# Patient Record
Sex: FEMALE | Race: AMERICAN INDIAN OR ALASKA NATIVE | NOT HISPANIC OR LATINO | Employment: FULL TIME | URBAN - METROPOLITAN AREA
[De-identification: names, ages, dates, MRNs, and addresses within clinical notes are randomized per-mention and may not be internally consistent; named-entity substitution may affect disease eponyms.]

---
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
## 2019-01-16 ENCOUNTER — ALLIED HEALTH/NURSE VISIT (OUTPATIENT)
Dept: FAMILY MEDICINE | Facility: CLINIC | Age: 19
End: 2019-01-16
Payer: COMMERCIAL

## 2019-01-16 DIAGNOSIS — Z30.42 ENCOUNTER FOR SURVEILLANCE OF INJECTABLE CONTRACEPTIVE: Primary | ICD-10-CM

## 2019-01-16 RX ORDER — MEDROXYPROGESTERONE ACETATE 150 MG/ML
150 INJECTION, SUSPENSION INTRAMUSCULAR
Status: DISCONTINUED | OUTPATIENT
Start: 2019-01-16 | End: 2020-02-19

## 2019-01-16 RX ADMIN — MEDROXYPROGESTERONE ACETATE 150 MG: 150 INJECTION, SUSPENSION INTRAMUSCULAR at 13:17

## 2019-01-16 NOTE — NURSING NOTE
Miracle Coburn  2000  Prior to injection verified patient identity using patient's name and date of birth.    The following medication was given:   MEDICATION: Depo Provera 150mg  ROUTE: IM  SITE: Arm - Left  DOSE: 1ml  LOT #: [id number]  :  Maciej  EXPIRATION DATE:  01/20  NDC#: 6108-8121-93     Was entire vial of medication used? Yes  Due to injection administration, patient instructed to remain in clinic for 15 minutes  afterwards, and to report any adverse reaction to me immediately.    Dr. Eubanks onsite and available for questions at time of injection.   Problem list reviewed for plan for this injection. Patient has orders and plan written on 10/16/18 by Dr. Yessenia MARCANO.    Patient should be seen and plan renewed before 10/16/19.   Patient should return between April 3 to May 1, 2019    Gertrude Bustillos, CMA

## 2019-04-26 ENCOUNTER — ALLIED HEALTH/NURSE VISIT (OUTPATIENT)
Dept: FAMILY MEDICINE | Facility: CLINIC | Age: 19
End: 2019-04-26
Payer: MEDICAID

## 2019-04-26 DIAGNOSIS — Z00.00 HEALTHCARE MAINTENANCE: Primary | ICD-10-CM

## 2019-04-26 RX ADMIN — MEDROXYPROGESTERONE ACETATE 150 MG: 150 INJECTION, SUSPENSION INTRAMUSCULAR at 09:38

## 2019-04-26 NOTE — NURSING NOTE
Prior to medication administration, I verified the patient identity using patient's name and date of birth. Patient was instructed to report any adverse reaction to me immediately.    I administered Depo 1mL to Miracle Coburn.    For injections only, was entire vial of medication used? Yes    Did the patient bring this medication to the clinic to be administered? No    Name of provider who requested the medication administration: Dr. Yessenia MARCANO  Name of provider on site (faculty or community preceptor) at the time of performing the medication administration: Dr. Eubanks    Date of next administration: July 12th thru August 9th  Date of next office visit with provider to renew medication plan (must be seen annually): Before 10-16-19    Aaliyah Amador MA

## 2019-08-08 ENCOUNTER — ALLIED HEALTH/NURSE VISIT (OUTPATIENT)
Dept: FAMILY MEDICINE | Facility: CLINIC | Age: 19
End: 2019-08-08
Payer: COMMERCIAL

## 2019-08-08 DIAGNOSIS — Z30.42 ENCOUNTER FOR SURVEILLANCE OF INJECTABLE CONTRACEPTIVE: Primary | ICD-10-CM

## 2019-08-08 RX ORDER — MEDROXYPROGESTERONE ACETATE 150 MG/ML
150 INJECTION, SUSPENSION INTRAMUSCULAR ONCE
Status: COMPLETED | OUTPATIENT
Start: 2019-08-08 | End: 2019-08-08

## 2019-08-08 RX ADMIN — MEDROXYPROGESTERONE ACETATE 150 MG: 150 INJECTION, SUSPENSION INTRAMUSCULAR at 08:55

## 2019-08-08 NOTE — NURSING NOTE
Clinic Administered Medication Documentation      Depo Provera Documentation    Prior to injection, verified patient identity using patient's name and date of birth. Medication was administered. Please see MAR and medication order for additional information. Patient instructed to remain in clinic for 15 minutes.    BP: Data Unavailable    LAST PAP/EXAM: No results found for: PAP  URINE HCG:not indicated    NEXT INJECTION DUE: 10/24/19 - 11/7/19    Was entire vial of medication used? Yes  Vial/Syringe: Single dose vial  Expiration Date:  10/2020      Name of provider who requested the medication administration: Dr Casas   Name of provider on site (faculty or community preceptor) at the time of performing the medication administration: Dr Strong    Date of next administration: 10/24-11/7  Date of next office visit with provider to renew medication plan (must be seen annually): 10/2019

## 2019-11-21 ENCOUNTER — OFFICE VISIT (OUTPATIENT)
Dept: FAMILY MEDICINE | Facility: CLINIC | Age: 19
End: 2019-11-21
Payer: COMMERCIAL

## 2019-11-21 VITALS
HEIGHT: 66 IN | TEMPERATURE: 97.6 F | OXYGEN SATURATION: 100 % | RESPIRATION RATE: 16 BRPM | DIASTOLIC BLOOD PRESSURE: 75 MMHG | WEIGHT: 185.4 LBS | HEART RATE: 77 BPM | SYSTOLIC BLOOD PRESSURE: 117 MMHG | BODY MASS INDEX: 29.8 KG/M2

## 2019-11-21 DIAGNOSIS — J35.8 TONSIL STONE: ICD-10-CM

## 2019-11-21 DIAGNOSIS — Z30.42 ENCOUNTER FOR SURVEILLANCE OF INJECTABLE CONTRACEPTIVE: Primary | ICD-10-CM

## 2019-11-21 LAB
CHOLEST SERPL-MCNC: 145.8 MG/DL (ref 0–200)
CHOLEST/HDLC SERPL: 4.7 {RATIO} (ref 0–5)
HBA1C MFR BLD: 5.1 % (ref 4.1–5.7)
HCG UR QL: NEGATIVE
HDLC SERPL-MCNC: 31 MG/DL
LDLC SERPL CALC-MCNC: 94 MG/DL (ref 0–129)
TRIGL SERPL-MCNC: 102.6 MG/DL (ref 0–150)
VLDL CHOLESTEROL: 20.5 MG/DL (ref 7–32)

## 2019-11-21 RX ADMIN — MEDROXYPROGESTERONE ACETATE 150 MG: 150 INJECTION, SUSPENSION INTRAMUSCULAR at 09:23

## 2019-11-21 ASSESSMENT — MIFFLIN-ST. JEOR: SCORE: 1632.72

## 2019-11-21 NOTE — NURSING NOTE
Clinic Administered Medication Documentation    MEDICATION LIST:   Depo Provera Documentation    Prior to injection, verified patient identity using patient's name and date of birth. Medication was administered. Please see MAR and medication order for additional information. Patient instructed to remain in clinic for 15 minutes.    BP: 117/75    LAST PAP/EXAM: No results found for: PAP  URINE HCG:negative    NEXT INJECTION DUE: 2/6/20 - 2/20/20    Was entire vial of medication used? Yes  Vial/Syringe: Single dose vial  Expiration Date:  01/21      Name of provider who requested the medication administration: Dr. Lucho LI  Name of provider on site (faculty or community preceptor) at the time of performing the medication administration:     Date of next administration:   Date of next office visit with provider to renew medication plan (must be seen annually):

## 2019-11-21 NOTE — PROGRESS NOTES
"  Child & Teen Check Up Year 18-20       Health History       Growth Percentile:    Wt Readings from Last 3 Encounters:   11/21/19 84.1 kg (185 lb 6.4 oz) (95 %)*   10/16/18 81.3 kg (179 lb 3.2 oz) (95 %)*   07/13/18 81.2 kg (179 lb) (95 %)*     * Growth percentiles are based on CDC (Girls, 2-20 Years) data.      Ht Readings from Last 2 Encounters:   11/21/19 1.676 m (5' 6\") (75 %)*   10/16/18 1.69 m (5' 6.54\") (81 %)*     * Growth percentiles are based on CDC (Girls, 2-20 Years) data.    93 %ile based on CDC (Girls, 2-20 Years) BMI-for-age based on body measurements available as of 11/21/2019.    Visit Vitals: /75   Pulse 77   Temp 97.6  F (36.4  C) (Oral)   Resp 16   Ht 1.676 m (5' 6\")   Wt 84.1 kg (185 lb 6.4 oz)   SpO2 100%   BMI 29.92 kg/m    BP Percentile: Blood pressure percentiles are not available for patients who are 18 years or older.    Informant: Patient    Patient, Family speaks English and so an  was not used.  Family History:   Family History   Problem Relation Age of Onset     Diabetes Mother      Hypertension Mother      Depression Mother      Anxiety Disorder Mother        Dyslipidemia Screening:  Pediatric hyperlipidemia risk factors discussed today: Elevated BMI >85th percentile  Lipid screening performed (recommended if any risk factors): Yes    Social History:     Did the family/guardian worry about wether their food would run out before they got money to buy more? No  Did the family/guardian find that the food they bought didn't last long enough and they didn't have money to get more?  No    Social History     Socioeconomic History     Marital status: Single     Spouse name: None     Number of children: None     Years of education: None     Highest education level: None   Occupational History     None   Social Needs     Financial resource strain: None     Food insecurity:     Worry: None     Inability: None     Transportation needs:     Medical: None     Non-medical: " None   Tobacco Use     Smoking status: Never Smoker     Smokeless tobacco: Never Used   Substance and Sexual Activity     Alcohol use: No     Drug use: No     Sexual activity: Never   Lifestyle     Physical activity:     Days per week: None     Minutes per session: None     Stress: None   Relationships     Social connections:     Talks on phone: None     Gets together: None     Attends Confucianist service: None     Active member of club or organization: None     Attends meetings of clubs or organizations: None     Relationship status: None     Intimate partner violence:     Fear of current or ex partner: None     Emotionally abused: None     Physically abused: None     Forced sexual activity: None   Other Topics Concern     None   Social History Narrative     None           Medical History: No past medical history on file.    Family History and past Medical History reviewed and unchanged/updated.      Vision Screen: Passed.  Hearing Screen: Passed.  Parental/or patient concerns: Concerned about tonsil stones. Cause bad breath and sore throat.    Daily Activities: Student at [employer]    Nutrition:    Describe intake: well balanced, half at home, half out    Environmental Risks:  TB exposure: No  Guns in house:None    STI Screening:  STI (including HIV) risk behaviors discussed today: Yes  HIV Screening (required once between ages 15-18 yrs): Ordered today  Other STI screening preformed (recommended if risk factors): Yes    Dental:  Have you been to a dentist this year? Yes and verbally encouraged family to continue to have annual dental check-up       Mental Health:  Teen Screen Discussed?: No              ROS   GENERAL: no recent fevers and activity level has been normal  SKIN: Negative for rash, birthmarks, acne, pigmentation changes  HEENT: Negative for hearing problems, vision problems, nasal congestion, eye discharge and eye redness  RESP: No cough, wheezing, difficulty breathing  CV: No cyanosis, fatigue  "with feeding  GI: Normal stools for age, no diarrhea or constipation   : Normal urination, no disharge or painful urination  MS: No swelling, muscle weakness, joint problems  NEURO: Moves all extremeties normally, normal activity for age  ALLERGY/IMMUNE: See allergy in history         Physical Exam:   /75   Pulse 77   Temp 97.6  F (36.4  C) (Oral)   Resp 16   Ht 1.676 m (5' 6\")   Wt 84.1 kg (185 lb 6.4 oz)   SpO2 100%   BMI 29.92 kg/m       GENERAL: Alert, well nourished, well developed, no acute distress, interacts appropriately for age  SKIN: skin is clear, no rash, acne, abnormal pigmentation or lesions  HEAD: The head is normocephalic.  EYES:The conjunctivae and cornea normal. PERRL, EOMI, Light reflex is symmetric and no eye movement on cover/uncover test. Sharp optic discs  EARS: The external auditory canals are clear and the tympanic membranes are normal; gray and transluscent.  NOSE: Clear, no discharge or congestion  MOUTH/THROAT: large tonsils, halitosis  NECK: The neck is supple and thyroid is normal, no masses  LYMPH NODES: No adenopathy  LUNGS: The lung fields are clear to auscultation,no rales, rhonchi, wheezing or retractions  HEART: The precordium is quiet. Rhythm is regular. S1 and S2 are normal. No murmurs.  ABDOMEN: The bowel sounds are normal. Abdomen soft, non tender,  non distended, no masses or hepatosplenomegaly.  EXTREMITIES: Symmetric extremities, FROM, no deformities. Spine is straight, no scoliosis  NEUROLOGIC: No focal findings. Cranial nerves grossly intact: DTR's normal. Normal gait, strength and tone  -deferred by patient         Assessment and Plan   Reason for Visit:   Chief Complaint   Patient presents with     Physical     needs depo     Additional Diagnoses: Tonsil stones    Refer to ENT for tonsillectomy     Patient Health Questionnaire - 9   [unfilled]    No concerns. Routine follow-up.    Immunizations:    Hx immunization reactions?  No  Immunization schedule " reviewed: Yes:  Following immunizations advised:  Tdap (if not given when entering 7th grade) Up to date for this immunization  Meningococcal (MCV) (If given before age 16 needs a booster at 16+ yo Up to date for this immunization  HPV Vaccine (Gardasil)  recommended for all at age 11 years: Up to date for this immunization    Labs:  Lipid and A1c for high risk Family history and BMI > 85%  STI screening  Depo Shot given     Schedule next visit in 3 months for Gerry Yepez DO

## 2019-11-21 NOTE — PROGRESS NOTES
Preceptor Attestation:   Patient seen, evaluated and discussed with the resident. I have verified the content of the note, which accurately reflects my assessment of the patient and the plan of care.   Supervising Physician:  Jo Eubanks MD

## 2019-11-21 NOTE — LETTER
November 26, 2019      Miracle Coburn  [address] [place] [address]        Dear Miracle,    Thank you for getting your care at [facility]. Please see below for your test results.    Resulted Orders   HCG Qualitative Urine (UPT) ([facility])   Result Value Ref Range    HCG Qual Urine Negative Negative   Neisseria gonorrhoeae PCR   Result Value Ref Range    Specimen Descrip Cervical     N Gonorrhea PCR Negative NEG^Negative      Comment:      Negative for N. gonorrhoeae rRNA by transcription mediated amplification.  A negative result by transcription mediated amplification does not preclude   the presence of N. gonorrhoeae infection because results are dependent on   proper and adequate collection, absence of inhibitors, and sufficient rRNA to   be detected.     Chlamydia trachomatis PCR   Result Value Ref Range    Specimen Description Cervical     Chlamydia Trachomatis PCR Negative NEG^Negative      Comment:      Negative for C. trachomatis rRNA by transcription mediated amplification.  A negative result by transcription mediated amplification does not preclude   the presence of C. trachomatis infection because results are dependent on   proper and adequate collection, absence of inhibitors, and sufficient rRNA to   be detected.     Treponema Abs w Reflex to RPR and Titer   Result Value Ref Range    Treponema Antibodies Nonreactive NR^Nonreactive   HIV Antigen Antibody Combo   Result Value Ref Range    HIV Antigen Antibody Combo Nonreactive NR^Nonreactive          Comment:      HIV-1 p24 Ag & HIV-1/HIV-2 Ab Not Detected   Hemoglobin A1c ([facility])   Result Value Ref Range    Hemoglobin A1C 5.1 4.1 - 5.7 %   Lipid Cascade ([facility])   Result Value Ref Range    Cholesterol 145.8 0.0 - 200.0 mg/dL    Cholesterol/HDL Ratio 4.7 0.0 - 5.0    HDL Cholesterol 31.0 (L) >40.0 mg/dL    Triglycerides 102.6 0.0 - 150.0 mg/dL    VLDL Cholesterol 20.5 7.0 - 32.0 mg/dL    LDL Cholesterol Calculated 94  0 - 129 mg/dL       If you have any concerns about these results please call and leave a message for me or send a [employer] message to the clinic.    Sincerely,    Nando Yepez, DO

## 2019-11-22 LAB
C TRACH DNA SPEC QL NAA+PROBE: NEGATIVE
HIV 1+2 AB+HIV1 P24 AG SERPL QL IA: NONREACTIVE
N GONORRHOEA DNA SPEC QL NAA+PROBE: NEGATIVE
SPECIMEN SOURCE: NORMAL
SPECIMEN SOURCE: NORMAL
T PALLIDUM AB SER QL: NONREACTIVE

## 2019-12-10 ENCOUNTER — OFFICE VISIT (OUTPATIENT)
Dept: OTOLARYNGOLOGY | Facility: CLINIC | Age: 19
End: 2019-12-10
Payer: COMMERCIAL

## 2019-12-10 VITALS
OXYGEN SATURATION: 100 % | RESPIRATION RATE: 12 BRPM | BODY MASS INDEX: 29.73 KG/M2 | HEART RATE: 75 BPM | SYSTOLIC BLOOD PRESSURE: 125 MMHG | HEIGHT: 66 IN | DIASTOLIC BLOOD PRESSURE: 76 MMHG | WEIGHT: 185 LBS

## 2019-12-10 DIAGNOSIS — J35.01 CHRONIC TONSILLITIS: Primary | ICD-10-CM

## 2019-12-10 PROCEDURE — 99204 OFFICE O/P NEW MOD 45 MIN: CPT | Performed by: OTOLARYNGOLOGY

## 2019-12-10 ASSESSMENT — MIFFLIN-ST. JEOR: SCORE: 1630.9

## 2019-12-10 NOTE — PATIENT INSTRUCTIONS
Scheduling Information  To schedule your CT/MRI scan, please contact [facility] at [phone] OR [facility] at [phone]    To schedule your Surgery, please contact our Specialty Schedulers at [phone]      ENT Clinic Locations Clinic Hours Telephone Number     [facility]  [address] [place] [address]   Monday:           1:00pm -- 5:00pm    Friday:              8:00am - 12:00pm   To schedule/reschedule an appointment with   Dr. Hackett,   please contact our   Specialty Scheduling Department at:     [phone]       [facility]  [address]  [place] [address] Tuesday:          8:00am -- 2:00pm         Urgent Care Locations Clinic Hours Telephone Numbers     [facility] Gallardo  [address] [place] [address]     Monday-Friday:     11:00am - 9:00pm    Saturday-Sunday:  9:00am - 5:00pm   [phone]     [facility]  [address] [place] [address]     Monday-Friday:      5:00pm - 9:00pm     Saturday-Sunday:  9:00am - 5:00pm   [phone]

## 2019-12-10 NOTE — LETTER
12/10/2019         RE: Miracle Coburn  [address] [place] [address]        Dear Colleague,    Thank you for referring your patient, Miracle Coburn, to the [facility]. Please see a copy of my visit note below.    History of Present Illness - Miracle Coburn is a 18 year old female here to see me for the first time with tonsil problems.    She first noticed the issue about 3-4 years ago with sore throats and tonsillitis. At first it was only mild, and a a few times per year. But at this point at least 6 times per year she will get tonsillitis. And with the increase in infections, she has had increasing numbers of tonsil stones and bad breath as well.    No previous ENT surgery.    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 age     Encounter for initial prescription of injectable contraceptive       Current Medications -   Current Outpatient Medications:      medroxyPROGESTERone (DEPO-PROVERA) 150 MG/ML injection, Inject 1 mL (150 mg) into the muscle every 3 months, Disp: 0.9 mL, Rfl: 0    Current Facility-Administered Medications:      medroxyPROGESTERone (DEPO-PROVERA) injection 150 mg, 150 mg, Intramuscular, Q90 Days, Coleen Byrne DO, 150 mg at 11/21/19 0923    Allergies -   Allergies   Allergen Reactions     Seasonal Allergies        Social History -   Social History     Socioeconomic History     Marital status: Single     Spouse name: Not on file     Number of children: Not on file     Years of education: Not on file     Highest education level: Not on file   Occupational History     Not on file   Social Needs     Financial resource strain: Not on file     Food insecurity:     Worry: Not on file     Inability: Not on file     Transportation needs:     Medical: Not on  file     Non-medical: Not on file   Tobacco Use     Smoking status: Never Smoker     Smokeless tobacco: Never Used   Substance and Sexual Activity     Alcohol use: No     Drug use: No     Sexual activity: Never   Lifestyle     Physical activity:     Days per week: Not on file     Minutes per session: Not on file     Stress: Not on file   Relationships     Social connections:     Talks on phone: Not on file     Gets together: Not on file     Attends Yazdanism service: Not on file     Active member of club or organization: Not on file     Attends meetings of clubs or organizations: Not on file     Relationship status: Not on file     Intimate partner violence:     Fear of current or ex partner: Not on file     Emotionally abused: Not on file     Physically abused: Not on file     Forced sexual activity: Not on file   Other Topics Concern     Not on file   Social History Narrative     Not on file       Family History -   Family History   Problem Relation Age of Onset     Diabetes Mother      Hypertension Mother      Depression Mother      Anxiety Disorder Mother        Review of Systems - As per HPI and PMHx, otherwise 10+ system review of the head and neck, and general constitution is negative.    Physical Exam  B/P: Data Unavailable, T: Data Unavailable, P: Data Unavailable, R: Data Unavailable  Vitals: There were no vitals taken for this visit.  BMI= There is no height or weight on file to calculate BMI.    General - The patient is well nourished and well developed, and appears to have good nutritional status.  Alert and oriented to person and place, answers questions and cooperates with examination appropriately.   Head and Face - Normocephalic and atraumatic, with no gross asymmetry noted of the contour of the facial features.  The facial nerve is intact, with strong symmetric movements.  Voice and Breathing - The patient was breathing comfortably without the use of accessory muscles. There was no wheezing,  stridor, or stertor.  The patients voice was clear and strong, and had appropriate pitch and quality.  Ears - The tympanic membranes are normal in appearance, bony landmarks are intact.  No retraction, perforation, or masses.  No fluid or purulence was seen in the external canal or the middle ear. No evidence of infection of the middle ear or external canal, cerumen was normal in appearance.  Eyes - Extraocular movements intact, and the pupils were reactive to light.  Sclera were not icteric or injected, conjunctiva were pink and moist.  Mouth - Examination of the oral cavity showed pink, healthy oral mucosa. No lesions or ulcerations noted.  The tongue was mobile and midline, and the dentition were in good condition.    Throat - The walls of the oropharynx were smooth, pink, moist, symmetric, and had no lesions or ulcerations.  The tonsillar pillars and soft palate were symmetric.  The uvula was midline on elevation.  The tonsils are not that large, but scarred with tonsil stones and exudative crypts.  Neck - Normal midline excursion of the laryngotracheal complex during swallowing.  Full range of motion on passive movement.  Palpation of the occipital, submental, submandibular, internal jugular chain, and supraclavicular nodes did not demonstrate any abnormal lymph nodes or masses.  The carotid pulse was palpable bilaterally.  Palpation of the thyroid was soft and smooth, with no nodules or goiter appreciated.  The trachea was mobile and midline.  Nose - External contour is symmetric, no gross deflection or scars.  Nasal mucosa is pink and moist with no abnormal mucus.  The septum was midline and non-obstructive, turbinates of normal size and position.  No polyps, masses, or purulence noted on examination.      A/P - Miracle Coburn is a 18 year old female  (J35.01) Chronic tonsillitis  (primary encounter diagnosis)    Based on the physical exam and history, my recommendation is for tonsillectomy  (without adenoidectomy).  The remainder of the visit was spent discussing the risks and benefits of tonsillectomy.  These included:  The risks of general anesthesia, bleeding, infection, possible need for emergency surgery to control bleeding, possible alteration of speech and swallowing, and even the possibility of continued throat problems following surgery.  They understood and wished to call in and schedule.      Again, thank you for allowing me to participate in the care of your patient.        Sincerely,        Nando Hackett MD

## 2019-12-10 NOTE — PROGRESS NOTES
History of Present Illness - Miracle Coburn is a 18 year old female here to see me for the first time with tonsil problems.    She first noticed the issue about 3-4 years ago with sore throats and tonsillitis. At first it was only mild, and a a few times per year. But at this point at least 6 times per year she will get tonsillitis. And with the increase in infections, she has had increasing numbers of tonsil stones and bad breath as well.    No previous ENT surgery.    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 age     Encounter for initial prescription of injectable contraceptive       Current Medications -   Current Outpatient Medications:      medroxyPROGESTERone (DEPO-PROVERA) 150 MG/ML injection, Inject 1 mL (150 mg) into the muscle every 3 months, Disp: 0.9 mL, Rfl: 0    Current Facility-Administered Medications:      medroxyPROGESTERone (DEPO-PROVERA) injection 150 mg, 150 mg, Intramuscular, Q90 Days, Coleen Byrne DO, 150 mg at 11/21/19 0923    Allergies -   Allergies   Allergen Reactions     Seasonal Allergies        Social History -   Social History     Socioeconomic History     Marital status: Single     Spouse name: Not on file     Number of children: Not on file     Years of education: Not on file     Highest education level: Not on file   Occupational History     Not on file   Social Needs     Financial resource strain: Not on file     Food insecurity:     Worry: Not on file     Inability: Not on file     Transportation needs:     Medical: Not on file     Non-medical: Not on file   Tobacco Use     Smoking status: Never Smoker     Smokeless tobacco: Never Used   Substance and Sexual Activity     Alcohol use: No     Drug use: No     Sexual activity: Never   Lifestyle     Physical activity:     Days per week: Not on file      Minutes per session: Not on file     Stress: Not on file   Relationships     Social connections:     Talks on phone: Not on file     Gets together: Not on file     Attends Restorationist service: Not on file     Active member of club or organization: Not on file     Attends meetings of clubs or organizations: Not on file     Relationship status: Not on file     Intimate partner violence:     Fear of current or ex partner: Not on file     Emotionally abused: Not on file     Physically abused: Not on file     Forced sexual activity: Not on file   Other Topics Concern     Not on file   Social History Narrative     Not on file       Family History -   Family History   Problem Relation Age of Onset     Diabetes Mother      Hypertension Mother      Depression Mother      Anxiety Disorder Mother        Review of Systems - As per HPI and PMHx, otherwise 10+ system review of the head and neck, and general constitution is negative.    Physical Exam  B/P: Data Unavailable, T: Data Unavailable, P: Data Unavailable, R: Data Unavailable  Vitals: There were no vitals taken for this visit.  BMI= There is no height or weight on file to calculate BMI.    General - The patient is well nourished and well developed, and appears to have good nutritional status.  Alert and oriented to person and place, answers questions and cooperates with examination appropriately.   Head and Face - Normocephalic and atraumatic, with no gross asymmetry noted of the contour of the facial features.  The facial nerve is intact, with strong symmetric movements.  Voice and Breathing - The patient was breathing comfortably without the use of accessory muscles. There was no wheezing, stridor, or stertor.  The patients voice was clear and strong, and had appropriate pitch and quality.  Ears - The tympanic membranes are normal in appearance, bony landmarks are intact.  No retraction, perforation, or masses.  No fluid or purulence was seen in the external canal or  the middle ear. No evidence of infection of the middle ear or external canal, cerumen was normal in appearance.  Eyes - Extraocular movements intact, and the pupils were reactive to light.  Sclera were not icteric or injected, conjunctiva were pink and moist.  Mouth - Examination of the oral cavity showed pink, healthy oral mucosa. No lesions or ulcerations noted.  The tongue was mobile and midline, and the dentition were in good condition.    Throat - The walls of the oropharynx were smooth, pink, moist, symmetric, and had no lesions or ulcerations.  The tonsillar pillars and soft palate were symmetric.  The uvula was midline on elevation.  The tonsils are not that large, but scarred with tonsil stones and exudative crypts.  Neck - Normal midline excursion of the laryngotracheal complex during swallowing.  Full range of motion on passive movement.  Palpation of the occipital, submental, submandibular, internal jugular chain, and supraclavicular nodes did not demonstrate any abnormal lymph nodes or masses.  The carotid pulse was palpable bilaterally.  Palpation of the thyroid was soft and smooth, with no nodules or goiter appreciated.  The trachea was mobile and midline.  Nose - External contour is symmetric, no gross deflection or scars.  Nasal mucosa is pink and moist with no abnormal mucus.  The septum was midline and non-obstructive, turbinates of normal size and position.  No polyps, masses, or purulence noted on examination.      A/P - Miracle Coburn is a 18 year old female  (J35.01) Chronic tonsillitis  (primary encounter diagnosis)    Based on the physical exam and history, my recommendation is for tonsillectomy (without adenoidectomy).  The remainder of the visit was spent discussing the risks and benefits of tonsillectomy.  These included:  The risks of general anesthesia, bleeding, infection, possible need for emergency surgery to control bleeding, possible alteration of speech and swallowing,  and even the possibility of continued throat problems following surgery.  They understood and wished to call in and schedule.

## 2019-12-11 ENCOUNTER — TELEPHONE (OUTPATIENT)
Dept: OTOLARYNGOLOGY | Facility: CLINIC | Age: 19
End: 2019-12-11

## 2019-12-11 NOTE — TELEPHONE ENCOUNTER
Type of surgery: tonsillectomoy,possible adenoidectomy (bilateral)  CPT 53357   Chronic tonsillitis J35.01  Location of surgery: [facility]  Date and time of surgery: 1-9-20  TBD  Surgeon: Dr Hackett  Pre-Op Appt Date: 12-26-19  Post-Op Appt Date: 1-21-20   Packet sent out: Yes  Pre-cert/Authorization completed:  No prior auth needed, per [facility] online list  Date: 12/11/2019    Thank you,   Rafaela Scott   Referral Department  [phone]

## 2019-12-26 ENCOUNTER — OFFICE VISIT (OUTPATIENT)
Dept: FAMILY MEDICINE | Facility: CLINIC | Age: 19
End: 2019-12-26
Payer: COMMERCIAL

## 2019-12-26 VITALS
HEART RATE: 80 BPM | BODY MASS INDEX: 29.03 KG/M2 | TEMPERATURE: 98.5 F | DIASTOLIC BLOOD PRESSURE: 76 MMHG | WEIGHT: 185 LBS | OXYGEN SATURATION: 98 % | RESPIRATION RATE: 20 BRPM | SYSTOLIC BLOOD PRESSURE: 121 MMHG | HEIGHT: 67 IN

## 2019-12-26 DIAGNOSIS — Z01.818 PREOP GENERAL PHYSICAL EXAM: Primary | ICD-10-CM

## 2019-12-26 DIAGNOSIS — J35.01 CHRONIC TONSILLITIS: ICD-10-CM

## 2019-12-26 DIAGNOSIS — Z28.21 INFLUENZA VACCINATION DECLINED BY PATIENT: ICD-10-CM

## 2019-12-26 LAB — HGB BLD-MCNC: 13.2 G/DL (ref 11.7–15.7)

## 2019-12-26 PROCEDURE — 99214 OFFICE O/P EST MOD 30 MIN: CPT | Performed by: NURSE PRACTITIONER

## 2019-12-26 PROCEDURE — 36415 COLL VENOUS BLD VENIPUNCTURE: CPT | Performed by: NURSE PRACTITIONER

## 2019-12-26 PROCEDURE — 85018 HEMOGLOBIN: CPT | Performed by: NURSE PRACTITIONER

## 2019-12-26 ASSESSMENT — MIFFLIN-ST. JEOR: SCORE: 1633.84

## 2019-12-26 ASSESSMENT — PAIN SCALES - GENERAL: PAINLEVEL: NO PAIN (0)

## 2019-12-26 NOTE — PROGRESS NOTES
Pre op and labs faxed to [facility], Dr Nando Hackett, 12/26/19 @ 10:52am, [phone].

## 2019-12-26 NOTE — PROGRESS NOTES
[address] [place] [address] [address]  [phone]  Dept: [phone]    PRE-OP EVALUATION:  Today's date: 2019    Miracle Coburn (: 2000) presents for pre-operative evaluation assessment as requested by Dr. Nando Hackett.  She requires evaluation and anesthesia risk assessment prior to undergoing surgery/procedure for treatment of Chronic Tonsillitis .    Proposed Surgery/ Procedure: Bilateral Tonsillectomy, Possible Adenoidectomy  Date of Surgery/ Procedure: 2020  Time of Surgery/ Procedure: 9:25am  Hospital/Surgical Facility: [facility]  Fax number for surgical facility: Available electronically  Primary Physician: Nando Yepez  Type of Anesthesia Anticipated: General    Patient has a Health Care Directive or Living Will:  NO    1. NO - Do you have a history of heart attack, stroke, stent, bypass or surgery on an artery in the head, neck, heart or legs?  2. NO - Do you ever have any pain or discomfort in your chest?  3. NO - Do you have a history of  Heart Failure?  4. NO - Are you troubled by shortness of breath when: walking on the level, up a slight hill or at night?  5. NO - Do you currently have a cold, bronchitis or other respiratory infection?  6. NO - Do you have a cough, shortness of breath or wheezing?  7. NO - Do you sometimes get pains in the calves of your legs when you walk?  8. NO - Do you or anyone in your family have previous history of blood clots?  9. NO - Do you or does anyone in your family have a serious bleeding problem such as prolonged bleeding following surgeries or cuts?  10. NO - Have you ever had problems with anemia or been told to take iron pills?  11. NO - Have you had any abnormal blood loss such as black, tarry or bloody stools, or abnormal vaginal bleeding?  12. NO - Have you ever had a blood transfusion?  13. NO - Have you or any of your relatives ever had problems with  anesthesia?  14. NO - Do you have sleep apnea, excessive snoring or daytime drowsiness?  15. NO - Do you have any prosthetic heart valves?  16. NO - Do you have prosthetic joints?  17. NO - Is there any chance that you may be pregnant?      HPI:     HPI related to upcoming procedure: Patient has chronic tonsillitis, tonsil stones and is scheduled forbilateral tonsillectomy, possible adenoidectomy  on 1/9/2020.      See problem list for active medical problems.  Problems all longstanding and stable, except as noted/documented.  See ROS for pertinent symptoms related to these conditions.      MEDICAL HISTORY:     Patient Active Problem List    Diagnosis Date Noted     Chronic tonsillitis 12/10/2019     Priority: Medium     Encounter for initial prescription of injectable contraceptive 10/16/2018     Priority: Medium     UPT negative 10/16/18 and depo given. RX good for 1 year.       Acanthosis nigricans 07/26/2017     Priority: Medium     Obesity, pediatric, BMI 95th to 98th percentile for age 07/26/2017     Priority: Medium     Routine infant or child health check (WELL) 08/19/2014     Priority: Medium     Health Care Home 10/16/2012     Priority: Medium     TIER 0  DX V65.8 REPLACED WITH 82722 HEALTH CARE HOME (04/08/2013)        History reviewed. No pertinent past medical history.  History reviewed. No pertinent surgical history.  Current Outpatient Medications   Medication Sig Dispense Refill     medroxyPROGESTERone (DEPO-PROVERA) 150 MG/ML injection Inject 1 mL (150 mg) into the muscle every 3 months 0.9 mL 0     OTC products: None, except as noted above    Allergies   Allergen Reactions     Seasonal Allergies       Latex Allergy: NO    Social History     Tobacco Use     Smoking status: Never Smoker     Smokeless tobacco: Never Used   Substance Use Topics     Alcohol use: No     History   Drug Use No       REVIEW OF SYSTEMS:   Constitutional, HEENT, cardiovascular, pulmonary, gi and gu systems are negative,  "except as otherwise noted.    EXAM:   /76 (BP Location: Left arm, Patient Position: Chair, Cuff Size: Adult Regular)   Pulse 80   Temp 98.5  F (36.9  C) (Oral)   Resp 20   Ht 1.689 m (5' 6.5\")   Wt 83.9 kg (185 lb)   LMP  (LMP Unknown)   SpO2 98%   BMI 29.41 kg/m      GENERAL APPEARANCE: healthy, alert and no distress     EYES: EOMI, PERRL     HENT: ear canals and TM's normal and nose and mouth without ulcers or lesions     NECK: no adenopathy, no asymmetry, masses, or scars and thyroid normal to palpation     RESP: lungs clear to auscultation - no rales, rhonchi or wheezes     CV: regular rates and rhythm, normal S1 S2, no S3 or S4 and no murmur, click or rub     ABDOMEN:  soft, nontender, no HSM or masses and bowel sounds normal     MS: extremities normal- no gross deformities noted, no evidence of inflammation in joints, FROM in all extremities.     SKIN: no suspicious lesions or rashes     NEURO: Normal strength and tone, sensory exam grossly normal, mentation intact and speech normal     PSYCH: mentation appears normal. and affect normal/bright     LYMPHATICS: No cervical adenopathy    DIAGNOSTICS:     EKG: Not indicated due to non-vascular surgery and low risk of event (age <65 and without cardiac risk factors)  Hemoglobin (indicated for history of anemia or procedure with significant blood loss such as tonsillectomy, major intraperitoneal surgery, vascular surgery, major spine surgery, total joint replacement)  Labs Drawn and in Process:   Unresulted Labs Ordered in the Past 30 Days of this Admission     Date and Time Order Name Status Description    12/26/2019 0922 HEMOGLOBIN In process           Recent Labs   Lab Test 11/21/19  0911 04/26/12  1622   HGB  --  13.0   A1C 5.1  --       Exam Date Exam Time Accession # Results    12/26/19  9:26 AM [id number]    Hemoglobin   Order: [id number]   Collected:  12/26/2019  9:26 AM Status:  Final result     Ref Range & Units 9:26 AM 7yr ago    " Hemoglobin 11.7 - 15.7 g/dL 13.2  13.0 R             IMPRESSION:   Reason for surgery/procedure: chronic tonsillitis/tonsillectomy, possible adenoidectomy  Diagnosis/reason for consult: preoperative exam    The proposed surgical procedure is considered INTERMEDIATE risk.    REVISED CARDIAC RISK INDEX  The patient has the following serious cardiovascular risks for perioperative complications such as (MI, PE, VFib and 3  AV Block):  No serious cardiac risks  INTERPRETATION: 0 risks: Class I (very low risk - 0.4% complication rate)    The patient has the following additional risks for perioperative complications:  No identified additional risks      ICD-10-CM    1. Preop general physical exam Z01.818 Hemoglobin   2. Chronic tonsillitis J35.01    3. Influenza vaccination declined by patient Z28.21        RECOMMENDATIONS:     --Consult hospital rounder / IM to assist post-op medical management    --Patient is on no chronic medications    APPROVAL GIVEN to proceed with proposed procedure, without further diagnostic evaluation       Signed Electronically by: FLORENTINO Multani CNP    Copy of this evaluation report is provided to requesting physician.    [facility] Preop Guidelines    Revised Cardiac Risk Index

## 2019-12-26 NOTE — LETTER
December 26, 2019      Miracle Coburn  [address] [place] [address]        Dear Ms.Cortes Corey Coburn,    Your Hemoglobin was normal for you.  Feel free to contact me with any questions or concerns. Thank you for allowing me to participate in your care.         Babita GOOD, CNP             Results for orders placed or performed in visit on 12/26/19   Hemoglobin     Status: None   Result Value Ref Range    Hemoglobin 13.2 11.7 - 15.7 g/dL

## 2019-12-26 NOTE — PATIENT INSTRUCTIONS
At [facility], we strive to deliver an exceptional experience to you, every time we see you.  If you receive a survey in the mail, please send us back your thoughts. We really do value your feedback.    Based on your medical history, these are the current health maintenance/preventive care services that you are due for (some may have been done at this visit.)  Health Maintenance Due   Topic Date Due     PHQ-2  01/01/2019     INFLUENZA VACCINE (1) 09/01/2019         Suggested websites for health information:  Www.[employer] : Up to date and easily searchable information on multiple topics.  Www.[employer].gov : medication info, interactive tutorials, watch real surgeries online  Www.familydoctor.org : good info from the Academy of Family Physicians  Www.cdc.gov : public health info, travel advisories, epidemics (H1N1)  Www.aap.org : children's health info, normal development, vaccinations  Www.health.[facility].[place].us : [place] dept of health, public health issues in [place], N1N1    Your care team:                            Family Medicine Internal Medicine   MD Dami Robles MD Shantel Branch-Fleming, MD Katya Georgiev PA-C Nam Ho, MD Pediatrics   WALE Kang, MD Praveena Marshall CNP, MD Deborah Mielke, MD Kim Thein, APRN CNP      Clinic hours: Monday - Thursday 7 am-7 pm; Fridays 7 am-5 pm.   Urgent care: Monday - Friday 11 am-9 pm; Saturday and Sunday 9 am-5 pm.  Pharmacy : Monday -Thursday 8 am-8 pm; Friday 8 am-6 pm; Saturday and Sunday 9 am-5 pm.     Clinic: [phone]   Pharmacy: [phone]      Before Your Surgery      Call your surgeon if there is any change in your health. This includes signs of a cold or flu (such as a sore throat, runny nose, cough, rash or fever).    Do not smoke, drink alcohol or take over the counter medicine (unless your surgeon or primary care doctor tells you to) for the  24 hours before and after surgery.    If you take prescribed drugs: Follow your doctor s orders about which medicines to take and which to stop until after surgery.    Eating and drinking prior to surgery: follow the instructions from your surgeon    Take a shower or bath the night before surgery. Use the soap your surgeon gave you to gently clean your skin. If you do not have soap from your surgeon, use your regular soap. Do not shave or scrub the surgery site.  Wear clean pajamas and have clean sheets on your bed.

## 2020-01-08 ENCOUNTER — ANESTHESIA EVENT (OUTPATIENT)
Dept: SURGERY | Facility: AMBULATORY SURGERY CENTER | Age: 20
End: 2020-01-08

## 2020-01-09 ENCOUNTER — ANESTHESIA (OUTPATIENT)
Dept: SURGERY | Facility: AMBULATORY SURGERY CENTER | Age: 20
End: 2020-01-09
Payer: COMMERCIAL

## 2020-01-09 ENCOUNTER — HOSPITAL ENCOUNTER (OUTPATIENT)
Facility: AMBULATORY SURGERY CENTER | Age: 20
Discharge: HOME | End: 2020-01-09
Attending: OTOLARYNGOLOGY | Admitting: OTOLARYNGOLOGY
Payer: COMMERCIAL

## 2020-01-09 VITALS
HEART RATE: 78 BPM | OXYGEN SATURATION: 98 % | DIASTOLIC BLOOD PRESSURE: 94 MMHG | SYSTOLIC BLOOD PRESSURE: 124 MMHG | TEMPERATURE: 97.8 F | RESPIRATION RATE: 20 BRPM

## 2020-01-09 DIAGNOSIS — J35.01 CHRONIC TONSILLITIS: ICD-10-CM

## 2020-01-09 LAB — HCG UR QL: NEGATIVE

## 2020-01-09 PROCEDURE — G8907 PT DOC NO EVENTS ON DISCHARG: HCPCS

## 2020-01-09 PROCEDURE — 42826 REMOVAL OF TONSILS: CPT | Performed by: OTOLARYNGOLOGY

## 2020-01-09 PROCEDURE — 42826 REMOVAL OF TONSILS: CPT

## 2020-01-09 PROCEDURE — 81025 URINE PREGNANCY TEST: CPT | Performed by: ANESTHESIOLOGY

## 2020-01-09 PROCEDURE — 88304 TISSUE EXAM BY PATHOLOGIST: CPT | Performed by: OTOLARYNGOLOGY

## 2020-01-09 PROCEDURE — G8916 PT W IV AB GIVEN ON TIME: HCPCS

## 2020-01-09 RX ORDER — MEPERIDINE HYDROCHLORIDE 25 MG/ML
12.5 INJECTION INTRAMUSCULAR; INTRAVENOUS; SUBCUTANEOUS
Status: DISCONTINUED | OUTPATIENT
Start: 2020-01-09 | End: 2020-01-10 | Stop reason: HOSPADM

## 2020-01-09 RX ORDER — HYDROMORPHONE HYDROCHLORIDE 1 MG/ML
.3-.5 INJECTION, SOLUTION INTRAMUSCULAR; INTRAVENOUS; SUBCUTANEOUS EVERY 10 MIN PRN
Status: DISCONTINUED | OUTPATIENT
Start: 2020-01-09 | End: 2020-01-10 | Stop reason: HOSPADM

## 2020-01-09 RX ORDER — SODIUM CHLORIDE, SODIUM LACTATE, POTASSIUM CHLORIDE, CALCIUM CHLORIDE 600; 310; 30; 20 MG/100ML; MG/100ML; MG/100ML; MG/100ML
INJECTION, SOLUTION INTRAVENOUS CONTINUOUS
Status: DISCONTINUED | OUTPATIENT
Start: 2020-01-09 | End: 2020-01-10 | Stop reason: HOSPADM

## 2020-01-09 RX ORDER — DEXAMETHASONE SODIUM PHOSPHATE 4 MG/ML
10 INJECTION, SOLUTION INTRA-ARTICULAR; INTRALESIONAL; INTRAMUSCULAR; INTRAVENOUS; SOFT TISSUE ONCE
Status: COMPLETED | OUTPATIENT
Start: 2020-01-09 | End: 2020-01-09

## 2020-01-09 RX ORDER — DEXAMETHASONE SODIUM PHOSPHATE 4 MG/ML
4 INJECTION, SOLUTION INTRA-ARTICULAR; INTRALESIONAL; INTRAMUSCULAR; INTRAVENOUS; SOFT TISSUE EVERY 10 MIN PRN
Status: DISCONTINUED | OUTPATIENT
Start: 2020-01-09 | End: 2020-01-10 | Stop reason: HOSPADM

## 2020-01-09 RX ORDER — FENTANYL CITRATE 50 UG/ML
25-50 INJECTION, SOLUTION INTRAMUSCULAR; INTRAVENOUS
Status: DISCONTINUED | OUTPATIENT
Start: 2020-01-09 | End: 2020-01-10 | Stop reason: HOSPADM

## 2020-01-09 RX ORDER — NALOXONE HYDROCHLORIDE 0.4 MG/ML
.1-.4 INJECTION, SOLUTION INTRAMUSCULAR; INTRAVENOUS; SUBCUTANEOUS
Status: DISCONTINUED | OUTPATIENT
Start: 2020-01-09 | End: 2020-01-10 | Stop reason: HOSPADM

## 2020-01-09 RX ORDER — LIDOCAINE HYDROCHLORIDE 20 MG/ML
INJECTION, SOLUTION INFILTRATION; PERINEURAL PRN
Status: DISCONTINUED | OUTPATIENT
Start: 2020-01-09 | End: 2020-01-09

## 2020-01-09 RX ORDER — OXYCODONE HYDROCHLORIDE 5 MG/1
5 TABLET ORAL EVERY 4 HOURS PRN
Qty: 30 TABLET | Refills: 0 | Status: SHIPPED | OUTPATIENT
Start: 2020-01-09 | End: 2020-01-14

## 2020-01-09 RX ORDER — KETOROLAC TROMETHAMINE 30 MG/ML
30 INJECTION, SOLUTION INTRAMUSCULAR; INTRAVENOUS EVERY 6 HOURS PRN
Status: DISCONTINUED | OUTPATIENT
Start: 2020-01-09 | End: 2020-01-10 | Stop reason: HOSPADM

## 2020-01-09 RX ORDER — ONDANSETRON 4 MG/1
4 TABLET, ORALLY DISINTEGRATING ORAL EVERY 30 MIN PRN
Status: DISCONTINUED | OUTPATIENT
Start: 2020-01-09 | End: 2020-01-10 | Stop reason: HOSPADM

## 2020-01-09 RX ORDER — LIDOCAINE 40 MG/G
CREAM TOPICAL
Status: DISCONTINUED | OUTPATIENT
Start: 2020-01-09 | End: 2020-01-10 | Stop reason: HOSPADM

## 2020-01-09 RX ORDER — PROPOFOL 10 MG/ML
INJECTION, EMULSION INTRAVENOUS PRN
Status: DISCONTINUED | OUTPATIENT
Start: 2020-01-09 | End: 2020-01-09

## 2020-01-09 RX ORDER — OXYCODONE HYDROCHLORIDE 5 MG/1
5 TABLET ORAL EVERY 4 HOURS PRN
Qty: 42 TABLET | Refills: 0 | Status: SHIPPED | OUTPATIENT
Start: 2020-01-09 | End: 2020-01-09

## 2020-01-09 RX ORDER — GABAPENTIN 300 MG/1
300 CAPSULE ORAL ONCE
Status: COMPLETED | OUTPATIENT
Start: 2020-01-09 | End: 2020-01-09

## 2020-01-09 RX ORDER — ONDANSETRON 2 MG/ML
INJECTION INTRAMUSCULAR; INTRAVENOUS PRN
Status: DISCONTINUED | OUTPATIENT
Start: 2020-01-09 | End: 2020-01-09

## 2020-01-09 RX ORDER — ONDANSETRON 2 MG/ML
4 INJECTION INTRAMUSCULAR; INTRAVENOUS EVERY 30 MIN PRN
Status: DISCONTINUED | OUTPATIENT
Start: 2020-01-09 | End: 2020-01-10 | Stop reason: HOSPADM

## 2020-01-09 RX ORDER — ONDANSETRON 8 MG/1
8 TABLET, ORALLY DISINTEGRATING ORAL EVERY 8 HOURS PRN
Qty: 10 TABLET | Refills: 1 | Status: SHIPPED | OUTPATIENT
Start: 2020-01-09 | End: 2022-08-30

## 2020-01-09 RX ORDER — CEFAZOLIN SODIUM 2 G/100ML
2 INJECTION, SOLUTION INTRAVENOUS
Status: COMPLETED | OUTPATIENT
Start: 2020-01-09 | End: 2020-01-09

## 2020-01-09 RX ORDER — FENTANYL CITRATE 50 UG/ML
INJECTION, SOLUTION INTRAMUSCULAR; INTRAVENOUS PRN
Status: DISCONTINUED | OUTPATIENT
Start: 2020-01-09 | End: 2020-01-09

## 2020-01-09 RX ORDER — ALBUTEROL SULFATE 0.83 MG/ML
2.5 SOLUTION RESPIRATORY (INHALATION) EVERY 4 HOURS PRN
Status: DISCONTINUED | OUTPATIENT
Start: 2020-01-09 | End: 2020-01-10 | Stop reason: HOSPADM

## 2020-01-09 RX ORDER — LIDOCAINE HYDROCHLORIDE AND EPINEPHRINE 10; 10 MG/ML; UG/ML
INJECTION, SOLUTION INFILTRATION; PERINEURAL PRN
Status: DISCONTINUED | OUTPATIENT
Start: 2020-01-09 | End: 2020-01-09 | Stop reason: HOSPADM

## 2020-01-09 RX ORDER — PROPOFOL 10 MG/ML
INJECTION, EMULSION INTRAVENOUS CONTINUOUS PRN
Status: DISCONTINUED | OUTPATIENT
Start: 2020-01-09 | End: 2020-01-09

## 2020-01-09 RX ORDER — ACETAMINOPHEN 325 MG/1
975 TABLET ORAL ONCE
Status: COMPLETED | OUTPATIENT
Start: 2020-01-09 | End: 2020-01-09

## 2020-01-09 RX ORDER — OXYCODONE HYDROCHLORIDE 5 MG/1
5-10 TABLET ORAL EVERY 4 HOURS PRN
Status: DISCONTINUED | OUTPATIENT
Start: 2020-01-09 | End: 2020-01-10 | Stop reason: HOSPADM

## 2020-01-09 RX ORDER — CEFAZOLIN SODIUM 1 G/3ML
1 INJECTION, POWDER, FOR SOLUTION INTRAMUSCULAR; INTRAVENOUS SEE ADMIN INSTRUCTIONS
Status: DISCONTINUED | OUTPATIENT
Start: 2020-01-09 | End: 2020-01-10 | Stop reason: HOSPADM

## 2020-01-09 RX ORDER — OXYCODONE HCL 5 MG/5 ML
5 SOLUTION, ORAL ORAL EVERY 4 HOURS PRN
Status: DISCONTINUED | OUTPATIENT
Start: 2020-01-09 | End: 2020-01-10 | Stop reason: HOSPADM

## 2020-01-09 RX ADMIN — PROPOFOL 200 MG: 10 INJECTION, EMULSION INTRAVENOUS at 09:49

## 2020-01-09 RX ADMIN — FENTANYL CITRATE 50 MCG: 50 INJECTION, SOLUTION INTRAMUSCULAR; INTRAVENOUS at 10:17

## 2020-01-09 RX ADMIN — SODIUM CHLORIDE, SODIUM LACTATE, POTASSIUM CHLORIDE, CALCIUM CHLORIDE: 600; 310; 30; 20 INJECTION, SOLUTION INTRAVENOUS at 08:54

## 2020-01-09 RX ADMIN — LIDOCAINE HYDROCHLORIDE 60 MG: 20 INJECTION, SOLUTION INFILTRATION; PERINEURAL at 09:49

## 2020-01-09 RX ADMIN — KETOROLAC TROMETHAMINE 30 MG: 30 INJECTION, SOLUTION INTRAMUSCULAR; INTRAVENOUS at 10:35

## 2020-01-09 RX ADMIN — FENTANYL CITRATE 50 MCG: 50 INJECTION, SOLUTION INTRAMUSCULAR; INTRAVENOUS at 09:49

## 2020-01-09 RX ADMIN — GABAPENTIN 300 MG: 300 CAPSULE ORAL at 08:47

## 2020-01-09 RX ADMIN — Medication 5 MG: at 10:38

## 2020-01-09 RX ADMIN — DEXAMETHASONE SODIUM PHOSPHATE 10 MG: 4 INJECTION, SOLUTION INTRA-ARTICULAR; INTRALESIONAL; INTRAMUSCULAR; INTRAVENOUS; SOFT TISSUE at 09:59

## 2020-01-09 RX ADMIN — ACETAMINOPHEN 975 MG: 325 TABLET ORAL at 08:46

## 2020-01-09 RX ADMIN — CEFAZOLIN SODIUM 2 G: 2 INJECTION, SOLUTION INTRAVENOUS at 09:57

## 2020-01-09 RX ADMIN — PROPOFOL 50 MCG/KG/MIN: 10 INJECTION, EMULSION INTRAVENOUS at 09:57

## 2020-01-09 RX ADMIN — ONDANSETRON 4 MG: 2 INJECTION INTRAMUSCULAR; INTRAVENOUS at 09:59

## 2020-01-09 NOTE — ANESTHESIA POSTPROCEDURE EVALUATION
Anesthesia POST Procedure Evaluation    Patient: Miracle Coburn   MRN:     [id number] Gender:   female   Age:    19 year old :      2000        Preoperative Diagnosis: Chronic tonsillitis [J35.01]   Procedure(s):  BILATERAL TONSILLECTOMY, possible adenoidectomy   Postop Comments: No value filed.       Anesthesia Type:  Not documented  General    Reportable Event: NO     PAIN: Uncomplicated   Sign Out status: Comfortable, Well controlled pain     PONV: No PONV   Sign Out status:  No Nausea or Vomiting     Neuro/Psych: Uneventful perioperative course   Sign Out Status: Preoperative baseline; Age appropriate mentation     Airway/Resp.: Uneventful perioperative course   Sign Out Status: Non labored breathing, age appropriate RR; Resp. Status within EXPECTED Parameters     CV: Uneventful perioperative course   Sign Out status: Appropriate BP and perfusion indices; Appropriate HR/Rhythm     Disposition:   Sign Out in:  PACU  Disposition:  Phase II; Home  Recovery Course: Uneventful  Follow-Up: Not required           Last Anesthesia Record Vitals:  CRNA VITALS  2020 0948 - 2020 1048      2020             Resp Rate (observed):  (!) 2          Last PACU Vitals:  Vitals Value Taken Time   /76 2020 10:45 AM   Temp 36.3  C (97.3  F) 2020 10:38 AM   Pulse 84 2020 10:45 AM   Resp 23 2020 10:45 AM   SpO2 98 % 2020 10:46 AM   Temp src     NIBP     Pulse     SpO2     Resp     Temp     Ht Rate     Temp 2     Vitals shown include unvalidated device data.      Electronically Signed By: Franco Masterson MD, 2020, 3:40 PM

## 2020-01-09 NOTE — OP NOTE
PREOPERATIVE DIAGNOSES:   1. Chronic tonsillitis.   POSTOPERATIVE DIAGNOSES:   1. Chronic tonsillitis.   PROCEDURE PERFORMED: Tonsillectomy   SURGEON: Nando Hackett MD   ASSISTANT: None  BLOOD LOSS: 5 mL.   COMPLICATIONS: None.   SPECIMENS: None.   ANESTHESIA: GETA.   INDICATIONS: Miracle Coburn presented to me with a longstanding history of chronic tonsillitis and therefore my recommendation was for surgery. Preoperatively, the risks discussed included the risks of infection, bleeding, the risks of general anesthesia. Also, the possibility of need for emergent return to the operating room was discussed. They understood and wished to proceed.   OPERATIVE PROCEDURE: After being taken to the operating room and induction of general endotracheal tube anesthesia, the bed was rotated 90 degrees and a shoulder roll and head turban were placed. I suspended the patient from the Wolf stand using a Madhavi-Arturo mouthgag, and I grasped the right tonsil with an Allis forceps and retracted medially and performed subcapsular dissection utilizing monopolar cautery, and the right tonsil came out very smoothly. I then turned my attention to the left side, once again using an Allis forceps to grasp it and retract it medially, and then I performed subcapsular dissection, and the left tonsil also came out very smoothly. I released the mouthgag for 2 minutes to allow recirculation of blood to the tongue.   I resuspended the patient from the Wolf stand using a Madhavi-Arturo mouthgag, and there was good hemostasis. I applied a thin film of the hemostatic powder to the tonsil beds bilaterally and removed the mouthgag. The bed was rotated 90 degrees after I removed the shoulder roll and head turban, and the patient was awakened, extubated and sent to the recovery room in good condition.

## 2020-01-09 NOTE — DISCHARGE INSTRUCTIONS
[facility]   Adult Discharge Orders & Instructions     For 24 hours after surgery    1. Get plenty of rest.  A responsible adult must stay with you for at least 24 hours after you leave the hospital.   2. Do not drive or use heavy equipment.  If you have weakness or tingling, don't drive or use heavy equipment until this feeling goes away.  3. Do not drink alcohol.  4. Avoid strenuous or risky activities.  Ask for help when climbing stairs.   5. You may feel lightheaded.  IF so, sit for a few minutes before standing.  Have someone help you get up.   6. If you have nausea (feel sick to your stomach): Drink only clear liquids such as apple juice, ginger ale, broth or 7-Up.  Rest may also help.  Be sure to drink enough fluids.  Move to a regular diet as you feel able.  7. You may have a slight fever. Call the doctor if your fever is over 100 F (37.7 C) (taken under the tongue) or lasts longer than 24 hours.  8. You may have a dry mouth, a sore throat, muscle aches or trouble sleeping.  These should go away after 24 hours.  9. Do not make important or legal decisions.   Call your doctor for any of the followin.  Signs of infection (fever, growing tenderness at the surgery site, a large amount of drainage or bleeding, severe pain, foul-smelling drainage, redness, swelling).    2. It has been over 8 to 10 hours since surgery and you are still not able to urinate (pass water).    3.  Headache for over 24 hours.    To contact Dr Yoon call:  [phone]    Postoperative Care for Tonsillectomy (with or without adenoidectomy)    Recovery - There are a handful of issues that routinely occur during recover that should be anticipated during your recovery.    1. The pain and swelling almost always gets worse before it gets better, this is normal.  Usually it peaks 3 to 5 days after the surgery, and then begins improving at 7 to 8 days after surgery.  Of course, this is variable from person to person.  2. The  only dietary restriction is avoidance of hard or crunchy things until I see you in follow up.  If it makes a noise when you bite it, it is too hard.  Although it is good to begin eating again from day one, it is not unusual to not eat for several days after the procedure.  The most important thing is staying hydrated.  Drink fluids with electrolytes if possible, such as sports drinks.  3. The pain medication you were sent home with can make some people very nauseated.  To minimize this, avoid taking it on an empty stomach, or take smaller does with greater frequency.  For example if your dose is 2 teaspoons every four hours, try taking one teaspoon every two hours, etc.  4. Antibiotic are sometimes given after surgery, not to prevent infection, but some research shows that it helps to decrease pain.  This is not absolutely proven, and therefore is not absolutely necessary.   5. Try to stay ahead of the pain.  In other words, do not wait for pain medication to completely wear off before taking more pain medicine.  Instead, take the medication every 4 to 6 hours, even if it requires setting an alarm clock at night.  This is especially helpful during the first 5 days.  6. The uvula ( the small hanging object in the back of your mouth) frequently swells up after tonsillectomy, but will go back to normal.  This swelling can temporarily cause the sensation of something being stuck in your throat, it will go away with recovery.  Also, because of the arrangement of nerves under where the tonsils were, sharp ear pain is very common during recovery, and will also go away with recovery.   7. With adenoidectomy, a very strong and foul-smelling odor can occur about 4-7 days after surgery.  This fades rapidly, and unless there is an associated fever no antibiotics are necessary.  8. It is very common after tonsillectomy to experience ear pain. This is due to nerves on the side of the throat becoming inflamed, and causing the  perception of sudden episodes of ear pain.  This can be controlled with the same pain medication given for the surgery.     Activity - Avoid heavy lifting (greater than 20 pounds), strenuous exercise, or extremely cold environments until the follow up appointment.  Also, try to sleep with your head elevated.  An irritated cough from the breathing tube is fairly normal after surgery.    Medications - Except blood thinners, almost all medication can be re-started after tonsillectomy.      Complications - Bleeding is by far the most common complication after tonsillectomy.  If there are a few small drops or streaks of blood in the saliva that then goes away, this can be conservatively watched.  Gentle gargling with the ice water can also help stop this minor bleeding.  However, if the bleeding is persistent, or heavy bleeding occurs, do not hesitate.  Go to the emergency room to be evaluated.    Follow up - I like to see my patients about 2 weeks after the procedure to make sure that everything is healing appropriately.  Occasionally, there can be some longer - lasting side effects of surgery such as abnormal tongue sensations, or unusual swallowing.  However, if everything is healing well, the 2 week postoperative visit is all that will be necessary.    If there are any questions or issues with the above, or if there are other issues that concern you, always feel free to call the clinic and I am happy to speak with you as soon as I can.    Bashir Hackett MD   Otolaryngology  [facility]  [phone] After hours, [facility] option

## 2020-01-09 NOTE — ANESTHESIA PREPROCEDURE EVALUATION
Anesthesia Pre-Procedure Evaluation    Patient: Miracle Coburn   MRN:     [id number] Gender:   female   Age:    19 year old :      2000        Preoperative Diagnosis: Chronic tonsillitis [J35.01]   Procedure(s):  BILATERAL TONSILLECTOMY, possible adenoidectomy     History reviewed. No pertinent past medical history.   History reviewed. No pertinent surgical history.       Anesthesia Evaluation     .             ROS/MED HX    ENT/Pulmonary:  - neg pulmonary ROS     Neurologic:  - neg neurologic ROS     Cardiovascular:  - neg cardiovascular ROS       METS/Exercise Tolerance:     Hematologic:  - neg hematologic  ROS       Musculoskeletal:  - neg musculoskeletal ROS       GI/Hepatic:  - neg GI/hepatic ROS       Renal/Genitourinary:  - ROS Renal section negative       Endo:  - neg endo ROS       Psychiatric:  - neg psychiatric ROS       Infectious Disease:  - neg infectious disease ROS       Malignancy:      - no malignancy   Other:    - neg other ROS                     PHYSICAL EXAM:   Mental Status/Neuro: A/A/O   Airway: Facies: Feasible  Mallampati: I  Mouth/Opening: Full  TM distance: > 6 cm  Neck ROM: Full   Respiratory: Auscultation: CTAB     Resp. Rate: Normal     Resp. Effort: Normal      CV: Rhythm: Regular  Rate: Age appropriate  Heart: Normal Sounds  Edema: None   Comments:      Dental: Normal Dentition                LABS:  CBC:   Lab Results   Component Value Date    HGB 13.2 2019    HGB 13.0 2012    HCT 37.5 2012     BMP: No results found for: NA, POTASSIUM, CHLORIDE, CO2, BUN, CR, GLC  COAGS: No results found for: PTT, INR, FIBR  POC:   Lab Results   Component Value Date    HCG Negative 2020     OTHER:   Lab Results   Component Value Date    A1C 5.1 2019        Preop Vitals    BP Readings from Last 3 Encounters:   20 118/82   19 121/76   12/10/19 125/76    Pulse Readings from Last 3 Encounters:   19 80   12/10/19 75   19 77     "  Resp Readings from Last 3 Encounters:   12/26/19 20   12/10/19 12   11/21/19 16    SpO2 Readings from Last 3 Encounters:   01/09/20 99%   12/26/19 98%   12/10/19 100%      Temp Readings from Last 1 Encounters:   01/09/20 36.4  C (97.5  F) (Temporal)    Ht Readings from Last 1 Encounters:   12/26/19 1.689 m (5' 6.5\")      Wt Readings from Last 1 Encounters:   12/26/19 83.9 kg (185 lb)    Estimated body mass index is 29.41 kg/m  as calculated from the following:    Height as of 12/26/19: 1.689 m (5' 6.5\").    Weight as of 12/26/19: 83.9 kg (185 lb).     LDA:  Peripheral IV 01/09/20 Left Wrist (Active)   Site Assessment WDL 1/9/2020  8:51 AM   Line Status Infusing 1/9/2020  8:51 AM   Phlebitis Scale 0-->no symptoms 1/9/2020  8:51 AM   Infiltration Scale 0 1/9/2020  8:51 AM   Infiltration Site Treatment Method  None 1/9/2020  8:51 AM   Dressing Intervention New dressing  1/9/2020  8:51 AM   Number of days: 0        Assessment:   ASA SCORE: 1    H&P: History and physical reviewed and following examination; no interval change.   Smoking Status:  Non-Smoker/Unknown   NPO Status: NPO Appropriate     Plan:   Anes. Type:  General   Pre-Medication: None   Induction:  IV (Standard)   Airway: ETT; Oral   Access/Monitoring: PIV   Maintenance: Balanced     Postop Plan:   Postop Pain: Opioids  Postop Sedation/Airway: Not planned  Disposition: Outpatient     PONV Management:   Adult Risk Factors: Female, Non-Smoker, Postop Opioids   Prevention: Ondansetron, Dexamethasone, Propofol     CONSENT: Direct conversation   Plan and risks discussed with: Patient   Blood Products: Consent Deferred (Minimal Blood Loss)                   Franco Masterson MD  "

## 2020-01-09 NOTE — ANESTHESIA CARE TRANSFER NOTE
Patient: Miracle Coburn    Procedure(s):  BILATERAL TONSILLECTOMY, possible adenoidectomy    Diagnosis: Chronic tonsillitis [J35.01]  Diagnosis Additional Information: No value filed.    Anesthesia Type:   General     Note:  Airway :Face Mask  Patient transferred to:PACU  Handoff Report: Identifed the Patient, Identified the Reponsible Provider, Reviewed the pertinent medical history, Discussed the surgical course, Reviewed Intra-OP anesthesia mangement and issues during anesthesia, Set expectations for post-procedure period and Allowed opportunity for questions and acknowledgement of understanding      Vitals: (Last set prior to Anesthesia Care Transfer)    CRNA VITALS  1/9/2020 0948 - 1/9/2020 1028      1/9/2020             Resp Rate (observed):  (!) 2                Electronically Signed By: FLORENTINO Dee CRNA  January 9, 2020  10:28 AM

## 2020-01-13 ENCOUNTER — TELEPHONE (OUTPATIENT)
Dept: OTOLARYNGOLOGY | Facility: CLINIC | Age: 20
End: 2020-01-13

## 2020-01-13 DIAGNOSIS — G89.18 ACUTE POST-OPERATIVE PAIN: Primary | ICD-10-CM

## 2020-01-13 RX ORDER — ACETAMINOPHEN 325 MG/1
325-650 TABLET ORAL EVERY 6 HOURS PRN
Qty: 50 TABLET | Refills: 1 | Status: SHIPPED | OUTPATIENT
Start: 2020-01-13 | End: 2020-01-23

## 2020-01-13 RX ORDER — IBUPROFEN 200 MG
200-400 TABLET ORAL EVERY 6 HOURS PRN
Qty: 50 TABLET | Refills: 1 | Status: SHIPPED | OUTPATIENT
Start: 2020-01-13 | End: 2022-08-30

## 2020-01-13 NOTE — OR NURSING
I was going to give the pt oxycodone for pain, I originally signed out tablet, but realized that she needed to take the liquid as she just had her tonsils removed. I cancelled the removal of the tablet, when signing it out of Pyxis, but it must not have registered. The pt received 5 ml of oxycodone liquid. A discrepancy was created in pyxis which showed that we were up by 1 tablet and this was why.

## 2020-01-13 NOTE — TELEPHONE ENCOUNTER
Reason for call:  Other   Patient called regarding (reason for call): prescription  Additional comments:  Mom calling to get a refill of ibuprofen and tylenol.     [facility] pharmacy    Phone number to reach patient:  Home number on file [phone] (home)    Best Time:  Any     Can we leave a detailed message on this number?  YES

## 2020-01-14 LAB — COPATH REPORT: NORMAL

## 2020-01-21 ENCOUNTER — OFFICE VISIT (OUTPATIENT)
Dept: OTOLARYNGOLOGY | Facility: CLINIC | Age: 20
End: 2020-01-21
Payer: COMMERCIAL

## 2020-01-21 VITALS
HEART RATE: 70 BPM | WEIGHT: 185 LBS | HEIGHT: 67 IN | BODY MASS INDEX: 29.03 KG/M2 | OXYGEN SATURATION: 98 % | SYSTOLIC BLOOD PRESSURE: 119 MMHG | DIASTOLIC BLOOD PRESSURE: 69 MMHG | RESPIRATION RATE: 12 BRPM

## 2020-01-21 DIAGNOSIS — J35.01 CHRONIC TONSILLITIS: Primary | ICD-10-CM

## 2020-01-21 PROCEDURE — 99024 POSTOP FOLLOW-UP VISIT: CPT | Performed by: OTOLARYNGOLOGY

## 2020-01-21 ASSESSMENT — MIFFLIN-ST. JEOR: SCORE: 1633.84

## 2020-01-21 NOTE — PATIENT INSTRUCTIONS
Scheduling Information  To schedule your CT/MRI scan, please contact [facility] at [phone] OR [facility] at [phone]    To schedule your Surgery, please contact our Specialty Schedulers at [phone]      ENT Clinic Locations Clinic Hours Telephone Number     [facility]  [address] [place] [address]   Monday:           1:00pm -- 5:00pm    Friday:              8:00am - 12:00pm   To schedule/reschedule an appointment with   Dr. Hackett,   please contact our   Specialty Scheduling Department at:     [phone]       [place]  [address] [place]  [place] [address] Tuesday:          8:00am -- 2:00pm         Urgent Care Locations Clinic Hours Telephone Numbers     [facility]  [address] [address]     Monday-Friday:     11:00am - 9:00pm    Saturday-Sunday:  9:00am - 5:00pm   [phone]     [facility]  [address] [place] [address]     Monday-Friday:      5:00pm - 9:00pm     Saturday-Sunday:  9:00am - 5:00pm   [phone]

## 2020-01-21 NOTE — LETTER
1/21/2020         RE: Miracle Coburn  [address] [place] [address]        Dear Colleague,    Thank you for referring your patient, Miracle Coburn, to the [facility]. Please see a copy of my visit note below.    History of Present Illness - Miracle Coburn is a 19 year old female who is status post tonsillectomy on 1/9/2020.  There was the expected amount of discomfort in the postoperative period, but at this point the patient is back to a regular diet, and not needing pain medication.  There was no bleeding, and no fevers or chills.    B/P: Data Unavailable, Temperature: Data Unavailable, Pulse: Data Unavailable, Respirations: Data Unavailable  General - The patient is well nourished and well developed, and appears to have good nutritional status.  Alert and oriented to person and place, answers questions and cooperates with examination appropriately.   Head and Face - Normocephalic and atraumatic, with no gross asymmetry noted of the contour of the facial features.  The facial nerve is intact, with strong symmetric movements.  Eyes - Extraocular movements intact, and the pupils were reactive to light.  Sclera were not icteric or injected, conjunctiva were pink and moist.  Neck - Normal midline excursion of the laryngotracheal complex during swallowing.  Full range of motion on passive movement.  Palpation of the occipital, submental, submandibular, internal jugular chain, and supraclavicular nodes did not demonstrate any abnormal lymph nodes or masses.  The carotid pulse was palpable bilaterally.  Palpation of the thyroid was soft and smooth, with no nodules or goiter appreciated.  The trachea was mobile and midline.  Mouth - Examination of the oral cavity shows pink, healthy, moist mucosa.  No lesions or ulceration noted.  The dentition are in good repair.  The tongue is mobile and midline.  Oropharynx - The tonsil beds are remucosalizing  appropriately.  No signs of bleeding or clots.  The Uvula is midline and the soft palate is symmetric.     A/P - Miracle Coburn has had an uncomplicated tonsillectomy.  They have no restrictions at this point and can return on an as needed basis.        Again, thank you for allowing me to participate in the care of your patient.        Sincerely,        Nando Hackett MD

## 2020-01-21 NOTE — PROGRESS NOTES
History of Present Illness - Miracle Coburn is a 19 year old female who is status post tonsillectomy on 1/9/2020.  There was the expected amount of discomfort in the postoperative period, but at this point the patient is back to a regular diet, and not needing pain medication.  There was no bleeding, and no fevers or chills.    B/P: Data Unavailable, Temperature: Data Unavailable, Pulse: Data Unavailable, Respirations: Data Unavailable  General - The patient is well nourished and well developed, and appears to have good nutritional status.  Alert and oriented to person and place, answers questions and cooperates with examination appropriately.   Head and Face - Normocephalic and atraumatic, with no gross asymmetry noted of the contour of the facial features.  The facial nerve is intact, with strong symmetric movements.  Eyes - Extraocular movements intact, and the pupils were reactive to light.  Sclera were not icteric or injected, conjunctiva were pink and moist.  Neck - Normal midline excursion of the laryngotracheal complex during swallowing.  Full range of motion on passive movement.  Palpation of the occipital, submental, submandibular, internal jugular chain, and supraclavicular nodes did not demonstrate any abnormal lymph nodes or masses.  The carotid pulse was palpable bilaterally.  Palpation of the thyroid was soft and smooth, with no nodules or goiter appreciated.  The trachea was mobile and midline.  Mouth - Examination of the oral cavity shows pink, healthy, moist mucosa.  No lesions or ulceration noted.  The dentition are in good repair.  The tongue is mobile and midline.  Oropharynx - The tonsil beds are remucosalizing appropriately.  No signs of bleeding or clots.  The Uvula is midline and the soft palate is symmetric.     A/P - Miracle Coburn has had an uncomplicated tonsillectomy.  They have no restrictions at this point and can return on an as needed basis.

## 2020-02-19 ENCOUNTER — ALLIED HEALTH/NURSE VISIT (OUTPATIENT)
Dept: NURSING | Facility: CLINIC | Age: 20
End: 2020-02-19
Payer: COMMERCIAL

## 2020-02-19 VITALS
SYSTOLIC BLOOD PRESSURE: 135 MMHG | DIASTOLIC BLOOD PRESSURE: 84 MMHG | HEART RATE: 80 BPM | WEIGHT: 183.4 LBS | BODY MASS INDEX: 29.16 KG/M2

## 2020-02-19 DIAGNOSIS — Z30.42 ENCOUNTER FOR SURVEILLANCE OF INJECTABLE CONTRACEPTIVE: Primary | ICD-10-CM

## 2020-02-19 PROCEDURE — 99207 ZZC NO CHARGE NURSE ONLY: CPT

## 2020-02-19 PROCEDURE — 96372 THER/PROPH/DIAG INJ SC/IM: CPT

## 2020-02-19 RX ORDER — MEDROXYPROGESTERONE ACETATE 150 MG/ML
150 INJECTION, SUSPENSION INTRAMUSCULAR
Status: ACTIVE | OUTPATIENT
Start: 2020-02-19 | End: 2020-05-19

## 2020-02-19 RX ORDER — MEDROXYPROGESTERONE ACETATE 150 MG/ML
150 INJECTION, SUSPENSION INTRAMUSCULAR
Status: CANCELLED | OUTPATIENT
Start: 2020-02-19 | End: 2020-05-19

## 2020-02-19 NOTE — NURSING NOTE
BP: 135/84    LAST PAP/EXAM: No results found for: PAP  URINE HCG:not indicated    The following medication was given:     MEDICATION: Depo Provera 150mg  ROUTE: IM  SITE: Deltoid - Left  : Mylan  LOT #: [id number]  EXP:9/2020  NEXT INJECTION DUE: 5/6/20 - 5/20/20   Provider: Dr. Kate Munson

## 2020-02-19 NOTE — PROGRESS NOTES
Clinic Administered Medication Documentation    MEDICATION LIST:   Depo Provera Documentation    Prior to injection, verified patient identity using patient's name and date of birth. Medication was administered. Please see MAR and medication order for additional information. Patient instructed to report any adverse reaction to staff immediately .    BP: 135/84    LAST PAP/EXAM: No results found for: PAP  URINE HCG:not indicated    NEXT INJECTION DUE: 5/6/20 - 5/20/20    Was entire vial of medication used? Yes  Vial/Syringe: Single dose vial  Expiration Date:  9/2020

## 2020-05-27 ENCOUNTER — ALLIED HEALTH/NURSE VISIT (OUTPATIENT)
Dept: NURSING | Facility: CLINIC | Age: 20
End: 2020-05-27
Payer: COMMERCIAL

## 2020-05-27 ENCOUNTER — TELEPHONE (OUTPATIENT)
Dept: FAMILY MEDICINE | Facility: CLINIC | Age: 20
End: 2020-05-27

## 2020-05-27 VITALS — SYSTOLIC BLOOD PRESSURE: 119 MMHG | BODY MASS INDEX: 29.87 KG/M2 | WEIGHT: 187.9 LBS | DIASTOLIC BLOOD PRESSURE: 70 MMHG

## 2020-05-27 DIAGNOSIS — Z30.42 DEPO-PROVERA CONTRACEPTIVE STATUS: ICD-10-CM

## 2020-05-27 LAB — HCG UR QL: NEGATIVE

## 2020-05-27 PROCEDURE — 81025 URINE PREGNANCY TEST: CPT | Performed by: PEDIATRICS

## 2020-05-27 PROCEDURE — 99207 ZZC NO CHARGE NURSE ONLY: CPT

## 2020-05-27 PROCEDURE — 96372 THER/PROPH/DIAG INJ SC/IM: CPT

## 2020-05-27 RX ADMIN — MEDROXYPROGESTERONE ACETATE 150 MG: 150 INJECTION, SUSPENSION INTRAMUSCULAR at 11:10

## 2020-05-27 NOTE — TELEPHONE ENCOUNTER
Called patient, unable to leave voice message due to no voicemail box being set up at this time.     Eliza Urias CMA on 5/27/2020 at 5:59 PM

## 2020-05-27 NOTE — PROGRESS NOTES
Follow Up Injection    Patient returning during stated date range given at previous visit: No, urine pregnancy test performed, results (neg - injection administered.    If here at the correct interval:   BP Readings from Last 1 Encounters:   05/27/20 119/70     Wt Readings from Last 1 Encounters:   05/27/20 85.2 kg (187 lb 14.4 oz)       Last Pap/exam date: N/A      Side effects or problems with last injection?  No.  Date range is given to patient for next dose: yes        Depo Provera Documentation    URINE HCG: negative    Depo-Provera Standing Order inclusion/exclusion criteria reviewed.   Patient meets: inclusion criteria     BP: 119/70  LAST PAP/EXAM: No results found for: PAP    Prior to injection, verified patient identity using patient's name and date of birth. Medication was administered. Please see MAR and medication order for additional information.     Was entire vial of medication used? Yes  Vial/Syringe: Single dose vial  Expiration Date:  January 2021  Given: Left Deltoid    Patient instructed to report any adverse reaction to staff immediately  and stay in clinic after the injection but patient declined.      NEXT INJECTION DUE: 8/12/20 - 8/26/20      Staff Sig: Audrey Blake RN  [facility]/ [facility]

## 2020-08-26 ENCOUNTER — ALLIED HEALTH/NURSE VISIT (OUTPATIENT)
Dept: NURSING | Facility: CLINIC | Age: 20
End: 2020-08-26
Payer: COMMERCIAL

## 2020-08-26 VITALS — WEIGHT: 183.7 LBS | DIASTOLIC BLOOD PRESSURE: 65 MMHG | SYSTOLIC BLOOD PRESSURE: 103 MMHG | BODY MASS INDEX: 29.21 KG/M2

## 2020-08-26 DIAGNOSIS — Z30.42 ENCOUNTER FOR SURVEILLANCE OF INJECTABLE CONTRACEPTIVE: Primary | ICD-10-CM

## 2020-08-26 DIAGNOSIS — Z78.9 USES DEPO-PROVERA AS PRIMARY BIRTH CONTROL METHOD: Primary | ICD-10-CM

## 2020-08-26 PROCEDURE — 99207 ZZC NO CHARGE NURSE ONLY: CPT

## 2020-08-26 PROCEDURE — 96372 THER/PROPH/DIAG INJ SC/IM: CPT

## 2020-08-26 RX ORDER — MEDROXYPROGESTERONE ACETATE 150 MG/ML
150 INJECTION, SUSPENSION INTRAMUSCULAR ONCE
Status: COMPLETED | OUTPATIENT
Start: 2020-08-26 | End: 2020-08-26

## 2020-08-26 RX ADMIN — MEDROXYPROGESTERONE ACETATE 150 MG: 150 INJECTION, SUSPENSION INTRAMUSCULAR at 09:40

## 2020-08-26 NOTE — PATIENT INSTRUCTIONS
Depo Provera     NEXT INJECTION DUE: 11/11/20 - 11/25/20    Please schedule an annual physical with a provider during your next injection window, to get a new order placed for the next year.

## 2020-08-26 NOTE — PROGRESS NOTES
Depo Provera Documentation      Patient returning during stated date range given at previous visit: Yes      If here at the correct interval:   BP Readings from Last 1 Encounters:   08/26/20 103/65     Wt Readings from Last 1 Encounters:   08/26/20 83.3 kg (183 lb 11.2 oz)       Last Pap/exam date: N/A      Side effects or problems with last injection?  No.  Date range is given to patient for next dose: yes      URINE HCG: not indicated    Depo-Provera Standing Order inclusion/exclusion criteria reviewed.   Patient meets: inclusion criteria     BP: 103/65  LAST PAP/EXAM: No results found for: PAP    Prior to injection, verified patient identity using patient's name and date of birth. Medication was administered. Please see MAR and medication order for additional information.     Was entire vial of medication used? Yes  Vial/Syringe: Single dose vial  Expiration Date:  January 2021  Given: Right deltoid    Patient instructed to remain in clinic for 15 minutes, report any adverse reaction to staff immediately  and stay in clinic after the injection but patient declined.      NEXT INJECTION DUE: 11/11/20 - 11/25/20      Staff Sig: Audrey Blake RN  [facility]/ [facility]

## 2020-10-06 ENCOUNTER — VIRTUAL VISIT (OUTPATIENT)
Dept: URGENT CARE | Facility: CLINIC | Age: 20
End: 2020-10-06
Payer: COMMERCIAL

## 2020-10-06 ENCOUNTER — NURSE TRIAGE (OUTPATIENT)
Dept: NURSING | Facility: CLINIC | Age: 20
End: 2020-10-06

## 2020-10-06 DIAGNOSIS — Z20.822 SUSPECTED COVID-19 VIRUS INFECTION: Primary | ICD-10-CM

## 2020-10-06 PROCEDURE — 99213 OFFICE O/P EST LOW 20 MIN: CPT | Mod: 95 | Performed by: PHYSICIAN ASSISTANT

## 2020-10-06 NOTE — PATIENT INSTRUCTIONS
Patient Education     Coronavirus Disease 2019 (COVID-19): Caring for Yourself or Others   If you or a household member have symptoms of COVID-19, follow the guidelines below. This will help you manage symptoms and keep the virus from spreading.  If you have symptoms of COVID-19    Stay home and contact your care team. They will tell you what to do    Don t panic. Keep in mind that other illnesses can cause similar symptoms.    Stay away from work, school, and public places.    Limit physical contact with others in your home. Limit visitors. No kissing.    Clean surfaces you touch with disinfectant.    If you need to cough or sneeze, do it into a tissue. Then, throw the tissue into the trash. If you don't have tissues, cough or sneeze into the bend of your elbow.    Don t share food or personal items with people in your household. This includes items like eating and drinking utensils, towels, and bedding.    Wear a cloth face mask around other people. It should cover your nose and mouth. You may need to make your own mask using a bandana, T-shirt, or other cloth. See the CDC s instructions on how to make a mask.    If you need to go to a hospital or clinic, call ahead to let them know. Expect the care team to wear masks, gowns, gloves, and eye protection. You may be put in a separate room.    Follow all instructions from your care team.    If you have been diagnosed with COVID-19    Stay home and away from others, including other people in your home. (This is called self-isolation.) Don t leave home unless you need to get medical care. Don't go to work, school, or public places. Don't use buses, taxis, or other public transportation.    Follow all instructions from your care team.    If you need to go to a hospital or clinic, call ahead to let them know. Expect the care team to wear masks, gowns, gloves, and eye protection. You may be put in a separate room.    Wear a face mask over your nose and mouth. This is to  protect others from your germs. If you can t wear a mask, your caregivers should wear one. You may need to make your own mask using a bandana, T-shirt, or other cloth. See the CDC s instructions on how to make a mask.    Have no contact with pets and other animals.    Don t share food or personal items with people in your household. This includes items like eating and drinking utensils, towels, and bedding.    Don t share food or personal items with people in your household. This includes items like eating and drinking utensils, towels, and bedding.    If you need to cough or sneeze, do it into a tissue. Then, throw the tissue into the trash. If you don't have tissues, cough or sneeze into the bend of your elbow.    Wash your hands often.    Self-care at home   At this time, there is no medicine approved to prevent or treat COVID-19. Experts are testing different medicines, trying to find one that works.  So far, the most proven treatment is to support your body while it fights the virus.    Get extra rest.    Drink extra fluids (6 to 8 glasses of liquids each day), unless a doctor has told you not to. Ask your care team which fluids are best for you. Avoid fluids that contain caffeine or alcohol.    Taking over-the-counter (OTC) pain medicine to reduce pain and fever. Your care team will tell you which medicine to use.  If you ve been in the hospital for COVID-19, follow your care team s instructions. They will tell you when to stop self-isolation. They may also have you change positions to help your breathing (such as lying on your belly).  If a test showed that you have COVID-19, you may be asked to donate plasma after you ve recovered. (This is called COVID-19 convalescent plasma donation.) The plasma may contain antibodies to help fight the virus in others. Scientists are testing whether this might be a treatment in the future. For more information, talk to your care team.  Caring for a sick person     Follow  all instructions from the care team.    Wash your hands often.    Wear protective clothing as advised.    Make sure the sick person wears a mask. If they can't wear a mask, don't stay in the same room with them. If you must be in the same room, wear a face mask. Make sure the mask covers both the nose and mouth.    Keep track of the sick person s symptoms.    Clean surfaces often with disinfectant. This includes phones, kitchen counters, fridge door handles, bathroom surfaces, and others.    Don t let anyone share household items with the sick person. This includes eating and drinking tools, towels, sheets, and blankets.    Clean fabrics and laundry well.    Keep other people and pets away from the sick person.    When you can stop self-isolation  When you are sick with COVID-19, you should stay away from other people. This is called self-isolation. The rules for ending self-isolation depend on your health in general.  If you are normally healthy  You can stop self-isolation when all 3 of these are true:  1. You ve had no fever--and no medicine that reduces fever--for at least 24 hours. And   2. Your symptoms are better, such as cough or trouble breathing. And   3. At least 10 days have passed since your symptoms first started.  Talk with your care team before you leave home. They may tell you it s okay to leave, or they may give you different advice.  If you have a weak immune system  If you re being treated for cancer, have an immune disorder (such as HIV), or have had a transplant (organ or bone marrow), follow your care team s instructions. You may be able to end self-isolation when all 3 of these are true:  1. You have no fever without fever-reducing medicine. And   2. Your symptoms are better, such as cough or trouble breathing. And   3. You ve had 2 tests for COVID-19. The tests happened at least 24 hours apart, and both show that you don t have the virus. (If no tests are available, your care team may tell  you to follow the rules for normally healthy people above.)  When you return to public settings  When you are well enough to go outside your home, follow the CDC s guidance on cloth face masks.    Anyone over age 2 should wear a face mask in public, especially when it's hard to socially distance. This includes public transit, public protests and marches, and crowded stores, bars, and restaurants.    Face masks are most likely to reduce the spread of COVID-19 when they are widely used by people who are out in the public.  Certain people should not wear a face covering. These include:     Children under 2 years old    Anyone with a health, developmental, or mental health condition that can be made worse by wearing a mask    Anyone who is unconscious or unable to remove the face covering without help. See the CDC's guidance on who should not wear a face mask.  When to call your care team  Call your care team right away if a sick person has any of these:    Trouble breathing    Pain or pressure in chest  If a sick person has any of these, call 911:    Trouble breathing that gets worse    Pain or pressure in chest that gets worse    Blue tint to lips or face    Fast or irregular heartbeat    Confusion or trouble waking    Fainting or loss of consciousness    Coughing up blood  Going home from the hospital   If you have COVID-19 and were recently in the hospital:    Follow the instructions above for self-care and isolation.    Follow the hospital care team s instructions.    Ask questions if anything is unclear to you. Write down answers so you remember them.  Date last modified: 8/12/2020  Jambo last reviewed this educational content on 4/1/2020 2000-2020 [employer]. [address] [place] [address], [place] [address]. All rights reserved. This information is not intended as a substitute for professional medical care. Always follow your healthcare professional's instructions.

## 2020-10-06 NOTE — PROGRESS NOTES
"Miracle Coburn is a 19 year old female who is being evaluated via a billable telephone visit.      The patient has been notified of following:     \"This telephone visit will be conducted via a call between you and your physician/provider. We have found that certain health care needs can be provided without the need for a physical exam.  This service lets us provide the care you need with a short phone conversation.  If a prescription is necessary we can send it directly to your pharmacy.  If lab work is needed we can place an order for that and you can then stop by our lab to have the test done at a later time.    Telephone visits are billed at different rates depending on your insurance coverage. During this emergency period, for some insurers they may be billed the same as an in-person visit.  Please reach out to your insurance provider with any questions.    If during the course of the call the physician/provider feels a telephone visit is not appropriate, you will not be charged for this service.\"    Patient has given verbal consent for Telephone visit?  Yes    What phone number would you like to be contacted at?     How would you like to obtain your AVS? Mail a copy    Subjective     Miracle Coburn is a 19 year old female who presents via phone visit today for the following health issues: cough and fever    HPI  She notes a cough and fever of 99.7 since last night and now has noticed loss of smell and taste. Has mild sore throat and a little bit of phlegm and a runny nose.   Denies headache and shortness of breath.     No known COVID exposure.     Review of Systems   Constitutional, HEENT, cardiovascular, pulmonary, gi and gu systems are negative, except as otherwise noted.       Objective          Vitals:  No vitals were obtained today due to virtual visit.    healthy, alert and no distress  PSYCH: Alert and oriented times 3; coherent speech, normal   rate and volume, able to articulate " logical thoughts, able   to abstract reason, no tangential thoughts, no hallucinations   or delusions  Her affect is normal  RESP: No cough, no audible wheezing, able to talk in full sentences  Remainder of exam unable to be completed due to telephone visits    No results found for this or any previous visit (from the past 24 hour(s)).        Assessment & Plan     Suspected COVID-19 virus infection    - Symptomatic COVID-19 Virus (Coronavirus) by PCR       Advised self-isolation for herself and those she lives with.     Diagnosis and treatment plan were discussed with patient and/or parent. If symptoms worsen or do not improve in the next few days, follow-up with your primary care provider or return to clinic.  Patient verbalizes understanding of all things discussed. All questions were addressed and answered.       See Patient Instructions    No follow-ups on file.    Virtual Urgent Care  [facility] VIRTUAL URGENT CARE    Phone call duration: 5 minutes

## 2020-10-06 NOTE — TELEPHONE ENCOUNTER
Reason for Disposition    [1] COVID-19 infection suspected by caller or triager AND [2] mild symptoms (cough, fever, or others) AND [3] no complications or SOB    Additional Information    Negative: Fever present > 3 days (72 hours)    Negative: [1] Fever returns after gone for over 24 hours AND [2] symptoms worse or not improved    Negative: [1] Continuous (nonstop) coughing interferes with work or school AND [2] no improvement using cough treatment per protocol    Negative: MILD difficulty breathing (e.g., minimal/no SOB at rest, SOB with walking, pulse <100)    Negative: Chest pain or pressure    Negative: Fever > 103 F (39.4 C)    Negative: [1] Fever > 101 F (38.3 C) AND [2] age > 60    Negative: [1] Fever > 100.0 F (37.8 C) AND [2] bedridden (e.g., nursing home patient, CVA, chronic illness, recovering from surgery)    Negative: HIGH RISK patient (e.g., age > 64 years, diabetes, heart or lung disease, weak immune system) (Exception: Has already been evaluated by healthcare provider and has no new or worsening symptoms)    Negative: Patient sounds very sick or weak to the triager    Negative: SEVERE or constant chest pain or pressure (Exception: mild central chest pain, present only when coughing)    Negative: MODERATE difficulty breathing (e.g., speaks in phrases, SOB even at rest, pulse 100-120)    Negative: [1] COVID-19 exposure AND [2] no symptoms    Negative: COVID-19 and Breastfeeding, questions about    Negative: [1] Adult with possible COVID-19 symptoms AND [2] triager concerned about severity of symptoms or other causes    Negative: SEVERE difficulty breathing (e.g., struggling for each breath, speaks in single words)    Negative: Difficult to awaken or acting confused (e.g., disoriented, slurred speech)    Negative: Bluish (or gray) lips or face now    Negative: Shock suspected (e.g., cold/pale/clammy skin, too weak to stand, low BP, rapid pulse)    Negative: Sounds like a life-threatening  emergency to the triager    [facility] Specific Disposition  - [facility] Specific Patient Instructions  COVID 19 Nurse Triage Plan/Patient Instructions    Please be aware that novel coronavirus (COVID-19) may be circulating in the community. If you develop symptoms such as fever, cough, or SOB or if you have concerns about the presence of another infection including coronavirus (COVID-19), please contact your health care provider or visit [email or web address].       Virtual Visit with provider recommended. Reference Visit Selection Guide.    Thank you for taking steps to prevent the spread of this virus.  Limit your contact with others.  Wear a simple mask to cover your cough.  Wash your hands well and often.    Resources  [facility]: About COVID-19: [email or web address]  CDC: What to Do If You're Sick: www.cdc.gov/coronavirus/2019-ncov/about/steps-when-sick.html  CDC: Ending Home Isolation: www.cdc.gov/coronavirus/2019-ncov/hcp/disposition-in-home-patients.html   CDC: Caring for Someone: www.cdc.gov/coronavirus/2019-ncov/if-you-are-sick/care-for-someone.html   [facility]: Interim Guidance for Hospital Discharge to Home: www.health.[facility].mn.us/diseases/coronavirus/hcp/hospdischarge.pdf  [facility] clinical trials (COVID-19 research studies): clinicalaffairs.[facility]-clinical-trials   Below are the COVID-19 hotlines at the [facility] ([facility]). Interpreters are available.   For health questions: Call 211-003-6527 or 1-505.368.7262 (7 a.m. to 7 p.m.)  For questions about schools and childcare: Call 742-870-4270 or 1-378.625.3922 (7 a.m. to 7 p.m.)    Protocols used: CORONAVIRUS (COVID-19) DIAGNOSED OR [id number] 8.4.20

## 2020-10-08 ENCOUNTER — AMBULATORY - HEALTHEAST (OUTPATIENT)
Dept: FAMILY MEDICINE | Facility: CLINIC | Age: 20
End: 2020-10-08

## 2020-10-08 DIAGNOSIS — Z20.822 SUSPECTED COVID-19 VIRUS INFECTION: ICD-10-CM

## 2021-01-14 ENCOUNTER — ALLIED HEALTH/NURSE VISIT (OUTPATIENT)
Dept: NURSING | Facility: CLINIC | Age: 21
End: 2021-01-14
Payer: COMMERCIAL

## 2021-01-14 DIAGNOSIS — Z30.42 ENCOUNTER FOR SURVEILLANCE OF INJECTABLE CONTRACEPTIVE: Primary | ICD-10-CM

## 2021-01-14 LAB — HCG UR QL: NEGATIVE

## 2021-01-14 PROCEDURE — 81025 URINE PREGNANCY TEST: CPT | Performed by: FAMILY MEDICINE

## 2021-01-14 PROCEDURE — 96372 THER/PROPH/DIAG INJ SC/IM: CPT

## 2021-01-14 PROCEDURE — 99207 PR NO CHARGE NURSE ONLY: CPT

## 2021-01-14 RX ORDER — MEDROXYPROGESTERONE ACETATE 150 MG/ML
150 INJECTION, SUSPENSION INTRAMUSCULAR
Status: COMPLETED | OUTPATIENT
Start: 2021-01-14 | End: 2021-12-01

## 2021-01-14 RX ORDER — MEDROXYPROGESTERONE ACETATE 150 MG/ML
150 INJECTION, SUSPENSION INTRAMUSCULAR
Qty: 1 ML | Refills: 11 | OUTPATIENT
Start: 2021-01-14 | End: 2023-03-10

## 2021-01-14 RX ADMIN — MEDROXYPROGESTERONE ACETATE 150 MG: 150 INJECTION, SUSPENSION INTRAMUSCULAR at 10:35

## 2021-03-31 ENCOUNTER — OFFICE VISIT (OUTPATIENT)
Dept: FAMILY MEDICINE | Facility: CLINIC | Age: 21
End: 2021-03-31
Payer: COMMERCIAL

## 2021-03-31 VITALS
TEMPERATURE: 96.8 F | OXYGEN SATURATION: 100 % | HEART RATE: 80 BPM | DIASTOLIC BLOOD PRESSURE: 83 MMHG | BODY MASS INDEX: 30.76 KG/M2 | WEIGHT: 196 LBS | SYSTOLIC BLOOD PRESSURE: 122 MMHG | HEIGHT: 67 IN

## 2021-03-31 DIAGNOSIS — Z83.3 FAMILY HISTORY OF DIABETES MELLITUS: ICD-10-CM

## 2021-03-31 DIAGNOSIS — Z78.9 USES DEPO-PROVERA AS PRIMARY BIRTH CONTROL METHOD: ICD-10-CM

## 2021-03-31 DIAGNOSIS — Z00.00 ROUTINE GENERAL MEDICAL EXAMINATION AT A HEALTH CARE FACILITY: Primary | ICD-10-CM

## 2021-03-31 DIAGNOSIS — E66.811 CLASS 1 OBESITY DUE TO EXCESS CALORIES WITH BODY MASS INDEX (BMI) OF 31.0 TO 31.9 IN ADULT, UNSPECIFIED WHETHER SERIOUS COMORBIDITY PRESENT: ICD-10-CM

## 2021-03-31 DIAGNOSIS — Z12.4 SCREENING FOR MALIGNANT NEOPLASM OF CERVIX: ICD-10-CM

## 2021-03-31 DIAGNOSIS — L70.0 ACNE VULGARIS: ICD-10-CM

## 2021-03-31 DIAGNOSIS — Z11.3 SCREENING FOR STDS (SEXUALLY TRANSMITTED DISEASES): ICD-10-CM

## 2021-03-31 DIAGNOSIS — E66.09 CLASS 1 OBESITY DUE TO EXCESS CALORIES WITH BODY MASS INDEX (BMI) OF 31.0 TO 31.9 IN ADULT, UNSPECIFIED WHETHER SERIOUS COMORBIDITY PRESENT: ICD-10-CM

## 2021-03-31 DIAGNOSIS — Z11.59 NEED FOR HEPATITIS C SCREENING TEST: ICD-10-CM

## 2021-03-31 LAB
CHOLEST SERPL-MCNC: 167 MG/DL
DEPRECATED CALCIDIOL+CALCIFEROL SERPL-MC: 21 UG/L (ref 20–75)
GLUCOSE BLD-MCNC: 86 MG/DL (ref 70–99)
HBV SURFACE AG SERPL QL IA: NONREACTIVE
HCG UR QL: NEGATIVE
HCV AB SERPL QL IA: NONREACTIVE
HDLC SERPL-MCNC: 41 MG/DL
HGB BLD-MCNC: 14.9 G/DL (ref 11.7–15.7)
HIV 1+2 AB+HIV1 P24 AG SERPL QL IA: NONREACTIVE
LDLC SERPL CALC-MCNC: 102 MG/DL
NONHDLC SERPL-MCNC: 126 MG/DL
SPECIMEN SOURCE: NORMAL
T PALLIDUM AB SER QL: NONREACTIVE
T4 FREE SERPL-MCNC: 0.83 NG/DL (ref 0.76–1.46)
TRIGL SERPL-MCNC: 118 MG/DL
TSH SERPL DL<=0.005 MIU/L-ACNC: 4.06 MU/L (ref 0.4–4)
WET PREP SPEC: NORMAL

## 2021-03-31 PROCEDURE — 87340 HEPATITIS B SURFACE AG IA: CPT | Performed by: NURSE PRACTITIONER

## 2021-03-31 PROCEDURE — 99213 OFFICE O/P EST LOW 20 MIN: CPT | Mod: 25 | Performed by: NURSE PRACTITIONER

## 2021-03-31 PROCEDURE — 85018 HEMOGLOBIN: CPT | Performed by: NURSE PRACTITIONER

## 2021-03-31 PROCEDURE — 82306 VITAMIN D 25 HYDROXY: CPT | Performed by: NURSE PRACTITIONER

## 2021-03-31 PROCEDURE — 87491 CHLMYD TRACH DNA AMP PROBE: CPT | Performed by: NURSE PRACTITIONER

## 2021-03-31 PROCEDURE — 96372 THER/PROPH/DIAG INJ SC/IM: CPT | Performed by: NURSE PRACTITIONER

## 2021-03-31 PROCEDURE — 84443 ASSAY THYROID STIM HORMONE: CPT | Performed by: NURSE PRACTITIONER

## 2021-03-31 PROCEDURE — 84439 ASSAY OF FREE THYROXINE: CPT | Performed by: NURSE PRACTITIONER

## 2021-03-31 PROCEDURE — 86780 TREPONEMA PALLIDUM: CPT | Mod: 90 | Performed by: NURSE PRACTITIONER

## 2021-03-31 PROCEDURE — 81025 URINE PREGNANCY TEST: CPT | Performed by: NURSE PRACTITIONER

## 2021-03-31 PROCEDURE — 87591 N.GONORRHOEAE DNA AMP PROB: CPT | Performed by: NURSE PRACTITIONER

## 2021-03-31 PROCEDURE — 99000 SPECIMEN HANDLING OFFICE-LAB: CPT | Performed by: NURSE PRACTITIONER

## 2021-03-31 PROCEDURE — 87210 SMEAR WET MOUNT SALINE/INK: CPT | Performed by: NURSE PRACTITIONER

## 2021-03-31 PROCEDURE — 87389 HIV-1 AG W/HIV-1&-2 AB AG IA: CPT | Performed by: NURSE PRACTITIONER

## 2021-03-31 PROCEDURE — 82947 ASSAY GLUCOSE BLOOD QUANT: CPT | Performed by: NURSE PRACTITIONER

## 2021-03-31 PROCEDURE — 99395 PREV VISIT EST AGE 18-39: CPT | Mod: 25 | Performed by: NURSE PRACTITIONER

## 2021-03-31 PROCEDURE — G0145 SCR C/V CYTO,THINLAYER,RESCR: HCPCS | Performed by: NURSE PRACTITIONER

## 2021-03-31 PROCEDURE — 36415 COLL VENOUS BLD VENIPUNCTURE: CPT | Performed by: NURSE PRACTITIONER

## 2021-03-31 PROCEDURE — 86803 HEPATITIS C AB TEST: CPT | Performed by: NURSE PRACTITIONER

## 2021-03-31 PROCEDURE — 80061 LIPID PANEL: CPT | Performed by: NURSE PRACTITIONER

## 2021-03-31 RX ORDER — CLINDAMYCIN AND BENZOYL PEROXIDE 10; 50 MG/G; MG/G
GEL TOPICAL 2 TIMES DAILY
Qty: 50 G | Refills: 3 | Status: SHIPPED | OUTPATIENT
Start: 2021-03-31 | End: 2022-08-30

## 2021-03-31 RX ADMIN — MEDROXYPROGESTERONE ACETATE 150 MG: 150 INJECTION, SUSPENSION INTRAMUSCULAR at 08:26

## 2021-03-31 ASSESSMENT — PAIN SCALES - GENERAL: PAINLEVEL: NO PAIN (0)

## 2021-03-31 ASSESSMENT — MIFFLIN-ST. JEOR: SCORE: 1678.74

## 2021-03-31 NOTE — PROGRESS NOTES
SUBJECTIVE:   CC: Miracle Coburn is an 20 year old woman who presents for preventive health visit.       Patient has been advised of split billing requirements and indicates understanding: Yes  Healthy Habits:    Do you get at least three servings of calcium containing foods daily (dairy, green leafy vegetables, etc.)? yes    Amount of exercise or daily activities, outside of work: 1 day(s) per week    Problems taking medications regularly No    Medication side effects: No    Have you had an eye exam in the past two years? yes    Do you see a dentist twice per year? yes    Do you have sleep apnea, excessive snoring or daytime drowsiness?no      Acne recently.  Just using face wash and moisturizer    Today's PHQ-2 Score:   PHQ-2 ( 1999 Pfizer) 3/31/2021 12/26/2019   Q1: Little interest or pleasure in doing things 0 0   Q2: Feeling down, depressed or hopeless 0 0   PHQ-2 Score 0 0       Abuse: Current or Past(Physical, Sexual or Emotional)- No  Do you feel safe in your environment? Yes    Have you ever done Advance Care Planning? (For example, a Health Directive, POLST, or a discussion with a medical provider or your loved ones about your wishes): No, advance care planning information given to patient to review.  Patient declined advance care planning discussion at this time.    Social History     Tobacco Use     Smoking status: Never Smoker     Smokeless tobacco: Never Used   Substance Use Topics     Alcohol use: No     If you drink alcohol do you typically have >3 drinks per day or >7 drinks per week? No                     Reviewed orders with patient.  Reviewed health maintenance and updated orders accordingly - Yes  BP Readings from Last 3 Encounters:   03/31/21 122/83   08/26/20 103/65   05/27/20 119/70    Wt Readings from Last 3 Encounters:   03/31/21 88.9 kg (196 lb)   08/26/20 83.3 kg (183 lb 11.2 oz)   05/27/20 85.2 kg (187 lb 14.4 oz)                  Patient Active Problem List   Diagnosis      Health Care Home     Routine infant or child health check (WELL)     Acanthosis nigricans     Obesity, pediatric, BMI 95th to 98th percentile for age     Encounter for initial prescription of injectable contraceptive     Chronic tonsillitis     Uses Depo-Provera as primary birth control method     Class 1 obesity due to excess calories with body mass index (BMI) of 31.0 to 31.9 in adult, unspecified whether serious comorbidity present     Family history of diabetes mellitus     Past Surgical History:   Procedure Laterality Date     TONSILLECTOMY, ADENOIDECTOMY, COMBINED Bilateral 1/9/2020    Procedure: BILATERAL TONSILLECTOMY,;  Surgeon: Nando Hackett MD;  Location:  OR       Social History     Tobacco Use     Smoking status: Never Smoker     Smokeless tobacco: Never Used   Substance Use Topics     Alcohol use: No     Family History   Problem Relation Age of Onset     Diabetes Mother      Hypertension Mother      Depression Mother      Anxiety Disorder Mother      Diabetes Paternal Grandmother          Current Outpatient Medications   Medication Sig Dispense Refill     calcium carbonate-vitamin D (OSCAL W/D) 500-200 MG-UNIT tablet Take 1 tablet by mouth 2 times daily 180 tablet 3     clindamycin-benzoyl peroxide (BENZACLIN) 1-5 % external gel Apply topically 2 times daily 50 g 3     medroxyPROGESTERone (DEPO-PROVERA) 150 MG/ML IM injection Inject 1 mL (150 mg) into the muscle every 3 months 1 mL 11     BIOTIN PO        ibuprofen (ADVIL/MOTRIN) 200 MG tablet Take 1-2 tablets (200-400 mg) by mouth every 6 hours as needed for mild pain (Patient not taking: Reported on 3/31/2021) 50 tablet 1     ondansetron (ZOFRAN-ODT) 8 MG ODT tab Take 1 tablet (8 mg) by mouth every 8 hours as needed for nausea (Patient not taking: Reported on 3/31/2021) 10 tablet 1     Allergies   Allergen Reactions     Seasonal Allergies            Pertinent mammograms are reviewed under the imaging tab.  History of abnormal Pap smear:  "NO - age 21-29 PAP every 3 years recommended     Reviewed and updated as needed this visit by clinical staff                 Reviewed and updated as needed this visit by Provider                History reviewed. No pertinent past medical history.   Past Surgical History:   Procedure Laterality Date     TONSILLECTOMY, ADENOIDECTOMY, COMBINED Bilateral 1/9/2020    Procedure: BILATERAL TONSILLECTOMY,;  Surgeon: Nando Hackett MD;  Location: [facility] OR       ROS:  CONSTITUTIONAL: NEGATIVE for fever, chills, change in weight  INTEGUMENTARU/SKIN: NEGATIVE for worrisome rashes, moles or lesions  EYES: NEGATIVE for vision changes or irritation  ENT: NEGATIVE for ear, mouth and throat problems  RESP: NEGATIVE for significant cough or SOB  BREAST: NEGATIVE for masses, tenderness or discharge  CV: NEGATIVE for chest pain, palpitations or peripheral edema  GI: NEGATIVE for nausea, abdominal pain, heartburn, or change in bowel habits  : NEGATIVE for unusual urinary or vaginal symptoms. Periods are regular.  MUSCULOSKELETAL: NEGATIVE for significant arthralgias or myalgia  NEURO: NEGATIVE for weakness, dizziness or paresthesias  PSYCHIATRIC: NEGATIVE for changes in mood or affect    OBJECTIVE:   /83 (BP Location: Left arm, Patient Position: Sitting, Cuff Size: Adult Regular)   Pulse 80   Temp 96.8  F (36  C) (Tympanic)   Ht 1.689 m (5' 6.5\")   Wt 88.9 kg (196 lb)   SpO2 100%   Breastfeeding No   BMI 31.16 kg/m    EXAM:  GENERAL: healthy, alert and no distress  EYES: Eyes grossly normal to inspection, PERRL and conjunctivae and sclerae normal  HENT: ear canals and TM's normal, nose and mouth without ulcers or lesions  NECK: no adenopathy, no asymmetry, masses, or scars and thyroid normal to palpation  RESP: lungs clear to auscultation - no rales, rhonchi or wheezes  CV: regular rate and rhythm, normal S1 S2, no S3 or S4, no murmur, click or rub, no peripheral edema and peripheral pulses strong  ABDOMEN: soft, " nontender, no hepatosplenomegaly, no masses and bowel sounds normal   (female): normal female external genitalia, normal urethral meatus, vaginal mucosa pink, moist, well rugated, and normal cervix/adnexa/uterus without masses or discharge  MS: no gross musculoskeletal defects noted, no edema  SKIN: no suspicious lesions or rashes  NEURO: Normal strength and tone, mentation intact and speech normal  PSYCH: mentation appears normal, affect normal/bright    Diagnostic Test Results:  Labs reviewed in Epic  No results found for this or any previous visit (from the past 24 hour(s)).    ASSESSMENT/PLAN:   1. Routine general medical examination at a health care facility  - Hemoglobin  - Glucose, whole blood  - Lipid panel reflex to direct LDL Non-fasting  - Vitamin D Deficiency    2. Uses Depo-Provera as primary birth control method  - HCG Qual, Urine ([id number])  - calcium carbonate-vitamin D (OSCAL W/D) 500-200 MG-UNIT tablet; Take 1 tablet by mouth 2 times daily  Dispense: 180 tablet; Refill: 3    3. Class 1 obesity due to excess calories with body mass index (BMI) of 31.0 to 31.9 in adult, unspecified whether serious comorbidity present  - Glucose, whole blood  - Lipid panel reflex to direct LDL Non-fasting  - TSH with free T4 reflex    4. Family history of diabetes mellitus  Screening today.     5. Acne vulgaris  Advised on sunscreen usage.  - clindamycin-benzoyl peroxide (BENZACLIN) 1-5 % external gel; Apply topically 2 times daily  Dispense: 50 g; Refill: 3    6. Screening for STDs (sexually transmitted diseases)  - NEISSERIA GONORRHOEA PCR  - CHLAMYDIA TRACHOMATIS PCR  - HIV Antigen Antibody Combo  - Hepatitis B surface antigen  - Treponema Abs w Reflex to RPR and Titer  - Wet prep    7. Need for hepatitis C screening test  - Hepatitis C Screen Reflex to HCV RNA Quant and Genotype    8. Screening for malignant neoplasm of cervix  - Pap imaged thin layer screen only - recommended age 21 - 24 years    Patient has  "been advised of split billing requirements and indicates understanding: Yes  COUNSELING:   Reviewed preventive health counseling, as reflected in patient instructions       Regular exercise    Estimated body mass index is 29.21 kg/m  as calculated from the following:    Height as of 1/21/20: 1.689 m (5' 6.5\").    Weight as of 8/26/20: 83.3 kg (183 lb 11.2 oz).    Weight management plan: Discussed healthy diet and exercise guidelines    She reports that she has never smoked. She has never used smokeless tobacco.      Counseling Resources:  ATP IV Guidelines  Pooled Cohorts Equation Calculator  Breast Cancer Risk Calculator  BRCA-Related Cancer Risk Assessment: FHS-7 Tool  FRAX Risk Assessment  ICSI Preventive Guidelines  Dietary Guidelines for Americans, 2010  USDA's MyPlate  ASA Prophylaxis  Lung CA Screening    FLORENTINO Amaral CNP  [facility]  "

## 2021-03-31 NOTE — PATIENT INSTRUCTIONS
At [facility], we strive to deliver an exceptional experience to you, every time we see you. If you receive a survey, please complete it as we do value your feedback.  If you have MyChart, you can expect to receive results automatically within 24 hours of their completion.  Your provider will send a note interpreting your results as well.   If you do not have MyChart, you should receive your results in about a week by mail.    Your care team:                            Family Medicine Internal Medicine   MD Dami Robles MD Shantel Branch-Fleming, MD Srinivasa Vaka, MD Katya Belousova, PAJailynC  Jacqueline Babin, APRN CNP    Bobby Carbone, MD Pediatrics   Kenroy Landa, PAMICHAELA Sesay, CNP MD Mariama Kulkarni APRN CNP   MD Praveena Nguyen MD Deborah Mielke, MD Kathryn Sanchez, APRN [facility]      Clinic hours: Monday - Thursday 7 am-6 pm; Fridays 7 am-5 pm.   Urgent care: Monday - Friday 11 am-9 pm; Saturday and Sunday 9 am-5 pm.    Clinic: [phone]       [facility]: Monday - Thursday 8 am - 7 pm; Friday 8 am - 6 pm  [facility]: [phone]     Use www.[email or web address] for 24/7 diagnosis and treatment of dozens of conditions.  Preventive Health Recommendations  Female Ages 21 to 25     Yearly exam:     See your health care provider every year in order to  o Review health changes.   o Discuss preventive care.    o Review your medicines if your doctor has prescribed any.      You should be tested each year for STDs (sexually transmitted diseases).       Talk to your provider about how often you should have cholesterol testing.      Get a Pap test every three years. If you have an abnormal result, your doctor may have you test more often.      If you are at risk for diabetes, you should have a diabetes test (fasting glucose).     Shots:     Get a flu shot each year.     Get a tetanus  shot every 10 years.     Consider getting the shot (vaccine) that prevents cervical cancer (Gardasil).    Nutrition:     Eat at least 5 servings of fruits and vegetables each day.    Eat whole-grain bread, whole-wheat pasta and brown rice instead of white grains and rice.    Get adequate Calcium and Vitamin D.     Lifestyle    Exercise at least 150 minutes a week each week (30 minutes a day, 5 days a week). This will help you control your weight and prevent disease.    Limit alcohol to one drink per day.    No smoking.     Wear sunscreen to prevent skin cancer.    See your dentist every six months for an exam and cleaning.

## 2021-04-01 LAB
C TRACH DNA SPEC QL NAA+PROBE: NEGATIVE
N GONORRHOEA DNA SPEC QL NAA+PROBE: NEGATIVE
SPECIMEN SOURCE: NORMAL
SPECIMEN SOURCE: NORMAL

## 2021-04-02 LAB
COPATH REPORT: NORMAL
PAP: NORMAL

## 2021-06-15 ENCOUNTER — ANCILLARY PROCEDURE (OUTPATIENT)
Dept: GENERAL RADIOLOGY | Facility: CLINIC | Age: 21
End: 2021-06-15
Attending: NURSE PRACTITIONER
Payer: COMMERCIAL

## 2021-06-15 ENCOUNTER — OFFICE VISIT (OUTPATIENT)
Dept: FAMILY MEDICINE | Facility: CLINIC | Age: 21
End: 2021-06-15
Payer: COMMERCIAL

## 2021-06-15 VITALS
OXYGEN SATURATION: 100 % | TEMPERATURE: 97.9 F | DIASTOLIC BLOOD PRESSURE: 74 MMHG | BODY MASS INDEX: 32.27 KG/M2 | HEART RATE: 72 BPM | SYSTOLIC BLOOD PRESSURE: 110 MMHG | WEIGHT: 205.6 LBS | HEIGHT: 67 IN

## 2021-06-15 DIAGNOSIS — Z30.42 ENCOUNTER FOR SURVEILLANCE OF INJECTABLE CONTRACEPTIVE: Primary | ICD-10-CM

## 2021-06-15 DIAGNOSIS — M54.2 NECK PAIN: ICD-10-CM

## 2021-06-15 DIAGNOSIS — R63.5 WEIGHT GAIN: ICD-10-CM
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
## 2021-06-15 DIAGNOSIS — M25.562 ACUTE PAIN OF LEFT KNEE: ICD-10-CM

## 2021-06-15 PROBLEM — Z78.9 USES DEPO-PROVERA AS PRIMARY BIRTH CONTROL METHOD: Status: RESOLVED | Noted: 2021-03-31 | Resolved: 2021-06-15

## 2021-06-15 PROCEDURE — 36415 COLL VENOUS BLD VENIPUNCTURE: CPT | Performed by: NURSE PRACTITIONER

## 2021-06-15 PROCEDURE — 96372 THER/PROPH/DIAG INJ SC/IM: CPT | Performed by: NURSE PRACTITIONER

## 2021-06-15 PROCEDURE — 99214 OFFICE O/P EST MOD 30 MIN: CPT | Performed by: NURSE PRACTITIONER

## 2021-06-15 PROCEDURE — 84443 ASSAY THYROID STIM HORMONE: CPT | Performed by: NURSE PRACTITIONER

## 2021-06-15 PROCEDURE — 73564 X-RAY EXAM KNEE 4 OR MORE: CPT | Mod: LT | Performed by: RADIOLOGY

## 2021-06-15 RX ORDER — CALCIUM CARBONATE/VITAMIN D3 500MG-5MCG
1 TABLET ORAL 2 TIMES DAILY
COMMUNITY
Start: 2021-03-31 | End: 2022-08-30

## 2021-06-15 RX ADMIN — MEDROXYPROGESTERONE ACETATE 150 MG: 150 INJECTION, SUSPENSION INTRAMUSCULAR at 15:20

## 2021-06-15 ASSESSMENT — MIFFLIN-ST. JEOR: SCORE: 1722.29

## 2021-06-15 ASSESSMENT — PAIN SCALES - GENERAL: PAINLEVEL: NO PAIN (0)

## 2021-06-15 NOTE — PATIENT INSTRUCTIONS
Patient Education     Quadruped Arm-Reach Exercise       Do this exercise on your hands and knees. Keep your knees under your hips and your hands under your shoulders. Keep your spine in a neutral position (not arched or sagging). Keep your ears in line with your shoulders. Hold for a few seconds before starting the exercise:  1. Tighten your belly muscles (core) and raise 1 arm straight in front of you, palm down. Hold for 5 seconds, then lower. Repeat 5 times.  2. Do the exercise again, this time lifting your arm to the side. Repeat 5 times.  3. Do the exercise again, this time lifting your arm backward, palm up. Repeat 5 times.  Switch sides and do each exercise with the other arm.  Note: This exercise may not be advised after certain shoulder surgeries. Talk with your healthcare provider before doing it.  [employer] last reviewed this educational content on 7/1/2020 2000-2021 [employer]. All rights reserved. This information is not intended as a substitute for professional medical care. Always follow your healthcare professional's instructions.           Patient Education     Shoulder, Upper Back, and Arm Exercise: Overhead Arm Stretch   To start, stand tall with your ears, shoulders, and hips in line. Your feet should be slightly apart, positioned just under your hips. Focus your eyes directly in front of you. Stay in this position for a few seconds before starting the exercise. This helps increase your awareness of proper posture. You can also do this exercise sitting up in a sturdy chair. Before doing this exercise, talk with your healthcare provider to make sure it's safe for you to do.        Reach overhead and slightly back with both arms. Keep your shoulders and neck aligned and your elbows behind your shoulders:     With your palms facing the ceiling, turn your fingers inward. You can also do this exercise holding weights if directed by your healthcare provider or physical  therapist.    Take a deep breath. Breathe out and lower your elbows toward your buttocks. Hold for up to 5 seconds, then return to starting position.    Repeat 3 times, or as directed by your healthcare provider or physical therapist.  [employer] last reviewed this educational content on 7/1/2020 2000-2021 [employer]. All rights reserved. This information is not intended as a substitute for professional medical care. Always follow your healthcare professional's instructions.           Patient Education     Shoulder Clock Exercise    To start, stand tall with your ears, shoulders, and hips in line. Your feet should be slightly apart, positioned just under your hips. Focus your eyes directly in front of you.  this position for a few seconds before starting your exercise. This helps increase your awareness of proper posture.    Imagine that your right shoulder is the center of a clock. With the outer point of your shoulder, roll it around to slowly trace the outer edge of the clock.    Move clockwise first, then counterclockwise.    Repeat 3 to 5 times. Switch shoulders.  [employer] last reviewed this educational content on 3/1/2018    7122-6526 [employer]. All rights reserved. This information is not intended as a substitute for professional medical care. Always follow your healthcare professional's instructions.           Patient Education     Shoulder Shrug Exercise    To start, sit in a chair with your feet flat on the floor. Shift your weight slightly forward so you don't round your back. Relax. Keep your ears, shoulders, and hips aligned:    Raise both of your shoulders as high as you can, as if you were trying to touch them to your ears. Keep your head and neck still and relaxed.    Hold for a count of 10. Release.    Repeat 5 times.  For your safety, check with your healthcare provider before starting an exercise program.  [employer] last reviewed this educational content on  7/1/2020 2000-2021 [employer]. All rights reserved. This information is not intended as a substitute for professional medical care. Always follow your healthcare professional's instructions.           Patient Education     Shoulder Squeeze Exercise    To start, sit in a chair with your feet flat on the floor. Shift your weight slightly forward so you don't round your back. Relax. Keep your ears, shoulders, and hips aligned:    Raise your arms to shoulder height, elbows bent and palms forward.    Move your arms back, squeezing your shoulder blades together.    Hold for 10 seconds. Return to starting position.     Repeat 5 times.   For your safety, check with your healthcare provider before starting an exercise program.  [employer] last reviewed this educational content on 7/1/2020 2000-2021 [employer]. All rights reserved. This information is not intended as a substitute for professional medical care. Always follow your healthcare professional's instructions.           Patient Education     Neck Exercises: Active Neck Rotation    To start, lie on your back, knees bent and feet flat on the floor. Keep your ears, shoulders, and hips aligned, but don t press your lower back to the floor. Rest your hands on your pelvis. Breathe deeply and relax.  Here are the steps for the active neck rotation:    Use your neck muscles to turn your head to one side until you feel a stretch in the muscles.    Hold for  5 seconds. Then turn to the other side.    Repeat  5 times on each side.  Note: Keep your shoulders on the floor. Don t lift or tuck your chin as you turn your head.  [employer] last reviewed this educational content on 7/1/2020 2000-2021 [employer]. All rights reserved. This information is not intended as a substitute for professional medical care. Always follow your healthcare professional's instructions.           Patient Education     Neck Exercises: Overhead Arm Raise      Woman lying on back with knees bent. Arrows show breathing in and out.   To start, lie on your back, knees bent and feet flat on the floor. Keep your ears, shoulders, and hips aligned, but don t press your lower back to the floor. Rest your hands on your pelvis. Breathe deeply and relax. Tighten the belly muscles to keep the back from arching.  Here are the steps for the arm lift:     Woman lying on back doing overhead arm raise exercise.     Raise one arm overhead, then lower it. As you lower that arm, raise the other arm.    Continue to move both arms in slow, smooth arcs. Keep your arms straight and your head and neck relaxed.    Repeat  10 times with each arm.  For your safety, check with your healthcare provider before starting an exercise program.  [employer] last reviewed this educational content on 7/1/2020 2000-2021 [employer]. All rights reserved. This information is not intended as a substitute for professional medical care. Always follow your healthcare professional's instructions.           Patient Education     Neck Exercises: Head Lifts    Do this exercise on your hands and knees. Keep your knees under your hips and your hands under your shoulders. Keep your spine in a neutral position (not arched or sagging). Keep your ears in line with your shoulders. Hold for a few seconds before starting the exercise:    Keeping your back straight, slowly drop your chin toward your chest. Tuck in your chin.    Hold for 5 seconds. Then lift your head until your neck is level with your back.    Hold for 5 seconds. Repeat 5 to10 times.  [employer] last reviewed this educational content on 3/1/2018    3380-4579 [employer]. All rights reserved. This information is not intended as a substitute for professional medical care. Always follow your healthcare professional's instructions.           Patient Education     Neck Exercises: Neck Flex  To start, sit in a chair with your feet flat on  the floor. Your weight should be slightly forward so that you re balanced evenly on your buttocks. Relax your shoulders and keep your head level. Avoid arching your back or rounding your shoulders. Using a chair with arms may help you keep your balance:    Rest the back of your left hand against your lower back. Place your right palm on the top of your head.    Gently pull your head forward and down until you feel a stretch in the muscles in the back of your neck. Don t force the motion.    Hold for 20 seconds, then return to starting position. Switch arms.    Repeat 5 to 10 times.    [employer] last reviewed this educational content on 3/1/2018    5721-6078 [employer]. All rights reserved. This information is not intended as a substitute for professional medical care. Always follow your healthcare professional's instructions.           Patient Education     Head Tilt / Upper Trapezius Stretch (Flexibility)    17. Sit up straight in a chair with your head and neck in a neutral position, ears in line with shoulders. Hold the edge of your chair seat with your right hand. Tuck your chin in slightly.  18. Tilt your head to the left, while looking straight ahead.  19. Put your left hand on the right side of your head. Gently pull your head to the left. Hold for 30 to 60 seconds. Use gentle pressure to increase the stretch. Don t force your head into position.  20. Return your head and neck to the neutral position.  21. Repeat this exercise 2 to 3 times, or as instructed.  22. Switch sides and repeat 2 to 3 times, or as instructed.   Challenge yourself  Tuck one end of a towel under your left arm. Then bring the other end over your right shoulder. Pull the towel down on your right shoulder with both hands as you side-bend your head to the left. Repeat with the other side.   [employer] last reviewed this educational content on 3/1/2020    4017-6007 [employer]. All rights reserved. This information  is not intended as a substitute for professional medical care. Always follow your healthcare professional's instructions.

## 2021-06-15 NOTE — LETTER
June 16, 2021      Miraclemana Coburn  [address] [place] [address]        Dear Ms.Cortes Corey Coburn,    We are writing to inform you of your test results.    I am happy to report that your thyroid level is normal.     1) Make sure you drink at least 10 glasses of water a day   2) Try to make sure at least half of your plate at every meal is vegetables (yes, even breakfast!)   3) Choose a physical activity that you enjoy doing (walking, running, cynthia, yoga, boot camp, weightlifting) and do that at least three days a week.  If you can do more, that would be better, but set realistic goals for yourself   4)Try looking at [employer] for recipe ideas, calorie counters, and exercise motivation.  It is a good site with a lot of information.   If weight gain still an issue in 2-3 months, make an appointment to discuss other birth control options.     If you have any questions or concerns, please call the clinic at the number listed above.       Sincerely,      FLORENTINO López CNP/smh        Resulted Orders   TSH with free T4 reflex   Result Value Ref Range    TSH 2.39 0.40 - 4.00 mU/L

## 2021-06-15 NOTE — PROGRESS NOTES
Assessment & Plan     Encounter for surveillance of injectable contraceptive  Continue with depo for now.  Check thyroid.  Patient to continue working on lifestyle changes over the next three months.  If weight gain continues or does not improve, consider switching contraceptive method.  She agrees.     Acute pain of left knee  No abnormality on xray.  Will refer to sports medicine  - XR Knee Left G/E 4 Views; Future    Fall, initial encounter  As above.   - XR Knee Left G/E 4 Views; Future    Weight gain  As above.   - TSH with free T4 reflex    Neck pain  Patient interested in physical therapy.    - [facility]; Future    Ordering of each unique test  35 minutes spent on the date of the encounter doing chart review, history and exam, documentation and further activities per the note       See Patient Instructions    Return in about 3 months (around 9/15/2021), or if symptoms worsen or fail to improve, for wt gain.    FLORENTINO Amaral CNP  [facility]    Karina Rausch is a 20 year old who presents for the following health issues     HPI   Fell a month ago, knee cap impacted concrete.  Had some swelling initially and pain but pain has persisted  Going up stairs ok, walking fine now, deep bending and kneeling causes pain.  Works at a bakery.    Wt Readings from Last 5 Encounters:   06/15/21 93.3 kg (205 lb 9.6 oz)   03/31/21 88.9 kg (196 lb)   08/26/20 83.3 kg (183 lb 11.2 oz)   05/27/20 85.2 kg (187 lb 14.4 oz)   02/19/20 83.2 kg (183 lb 6.4 oz)   has noted weight gain with depo.  Unsure if she'd like to switch contraceptive methods.      Neck pain while working.  Looking down a lot. Started over last few months.        Review of Systems   Constitutional, HEENT, cardiovascular, pulmonary, GI, , musculoskeletal, neuro, skin, endocrine and psych systems are negative, except as otherwise noted.      Objective    /74 (BP Location: Left arm, Patient  "Position: Chair, Cuff Size: Adult Regular)   Pulse 72   Temp 97.9  F (36.6  C) (Tympanic)   Ht 1.689 m (5' 6.5\")   Wt 93.3 kg (205 lb 9.6 oz)   SpO2 100%   Breastfeeding No   BMI 32.69 kg/m    Body mass index is 32.69 kg/m .  Physical Exam   GENERAL: healthy, alert and no distress  EYES: Eyes grossly normal to inspection, PERRL and conjunctivae and sclerae normal  HENT: ear canals and TM's normal, nose and mouth without ulcers or lesions  NECK: no adenopathy, no asymmetry, masses, or scars and thyroid normal to palpation  RESP: lungs clear to auscultation - no rales, rhonchi or wheezes  CV: regular rate and rhythm, normal S1 S2, no S3 or S4, no murmur, click or rub, no peripheral edema and peripheral pulses strong  MS: neck exam shows normal strength, ROM is normal but some pain with flexion and +muscle spasm noted in cervical paraspinals and left knee with tenderness over medial and lateral joint lines as well as over patella.  ROM normal.  Neg mcmurrays; joint stable  SKIN: no suspicious lesions or rashes  NEURO: Normal strength and tone, mentation intact and speech normal  PSYCH: mentation appears normal, affect normal/bright    Results for orders placed or performed in visit on 06/15/21   XR Knee Left G/E 4 Views     Status: None    Narrative    Examination:  XR KNEE LEFT G/E 4 VIEWS    Date:  6/15/2021 4:54 PM     Clinical Information: Left knee pain after a fall.    Comparison: 2/26/2010.      Impression    Impression:    1.  Normal left knee radiographs. No fracture or joint effusion.  Normal joint alignment and spacing.    JULIO CÉSAR MICHELLE MD     Xr Knee Left G/e 4 Views    Result Date: 6/15/2021  Examination:  XR KNEE LEFT G/E 4 VIEWS Date:  6/15/2021 4:54 PM Clinical Information: Left knee pain after a fall. Comparison: 2/26/2010.     Impression: 1.  Normal left knee radiographs. No fracture or joint effusion. Normal joint alignment and spacing. JULIO CÉSAR MICHELLE MD            "

## 2021-06-16 LAB — TSH SERPL DL<=0.005 MIU/L-ACNC: 2.39 MU/L (ref 0.4–4)

## 2021-06-28 ENCOUNTER — OFFICE VISIT (OUTPATIENT)
Dept: ORTHOPEDICS | Facility: CLINIC | Age: 21
End: 2021-06-28
Attending: NURSE PRACTITIONER
Payer: COMMERCIAL

## 2021-06-28 DIAGNOSIS — M25.562 ACUTE PAIN OF LEFT KNEE: ICD-10-CM
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
## 2021-06-28 PROCEDURE — 99203 OFFICE O/P NEW LOW 30 MIN: CPT | Performed by: FAMILY MEDICINE

## 2021-06-28 NOTE — PROGRESS NOTES
CHIEF COMPLAINT:  Knee left (fell 1 month ago. Pain with kneeling)       HISTORY OF PRESENT ILLNESS  Ms. Suman Coburn is a pleasant 20 year old female who presents to clinic today with left knee pain.  She is seen at the request Daria Sesay.   Miracle fell directly onto the anterior portion of her knee about a month ago.  She feels pain at the anterior and medial portion of her left knee, just below the knee joint.  Of note, Miracle did suffer a fall as a young girl, when she was 9 or 10 years old she fell through a wooden platform at a playground at school.  She had a massive amount of knee swelling and was diagnosed with a ruptured bursa.  She has had some pain in her medial knee at the pes anserine bursa since this time.        Additional history: as documented    MEDICAL HISTORY  Patient Active Problem List   Diagnosis     Health Care Home     Routine infant or child health check (WELL)     Acanthosis nigricans     Obesity, pediatric, BMI 95th to 98th percentile for age     Encounter for surveillance of injectable contraceptive     Chronic tonsillitis     Class 1 obesity due to excess calories with body mass index (BMI) of 31.0 to 31.9 in adult, unspecified whether serious comorbidity present     Family history of diabetes mellitus       Current Outpatient Medications   Medication Sig Dispense Refill     BIOTIN PO        calcium carbonate-vitamin D (OSCAL W/D) 500-200 MG-UNIT tablet Take 1 tablet by mouth 2 times daily 180 tablet 3     clindamycin-benzoyl peroxide (BENZACLIN) 1-5 % external gel Apply topically 2 times daily 50 g 3     ibuprofen (ADVIL/MOTRIN) 200 MG tablet Take 1-2 tablets (200-400 mg) by mouth every 6 hours as needed for mild pain (Patient not taking: Reported on 3/31/2021) 50 tablet 1     medroxyPROGESTERone (DEPO-PROVERA) 150 MG/ML IM injection Inject 1 mL (150 mg) into the muscle every 3 months 1 mL 11     ondansetron (ZOFRAN-ODT) 8 MG ODT tab Take 1 tablet (8 mg) by mouth every 8  hours as needed for nausea (Patient not taking: Reported on 3/31/2021) 10 tablet 1     OYSCO 500 + D 500-200 MG-UNIT tablet Take 1 tablet by mouth 2 times daily         Allergies   Allergen Reactions     Seasonal Allergies        Family History   Problem Relation Age of Onset     Diabetes Mother      Hypertension Mother      Depression Mother      Anxiety Disorder Mother      Diabetes Paternal Grandmother        Additional medical/Social/Surgical histories reviewed in EPIC and updated as appropriate.        PHYSICAL EXAM  General  - normal appearance, in no obvious distress  HEENT  - conjunctivae not injected, moist mucous membranes  CV  - normal popliteal pulse  Pulm  - normal respiratory pattern, non-labored  Musculoskeletal - left knee  - stance: normal gait without limp  - inspection: no swelling or effusion, normal bone and joint alignment, no obvious deformity  - palpation: tender over tibial tuberosity, pes bursa  - ROM: 135 degrees flexion, -5 degrees extension, not painful, normal actively and passively compared to contralateral  - strength: 5/5 in flexion, 5/5 in extension  - special tests:  (-) Lachman  (-) Sandoval  (-) varus at 0 and 30 degrees flexion  (-) valgus at 0 and 30 degrees flexion    Neuro  - no sensory or motor deficit, grossly normal coordination, normal muscle tone  Skin  - no ecchymosis, erythema, warmth, or induration, no obvious rash  Psych  - interactive, appropriate, normal mood and affect                     ASSESSMENT & PLAN  Ms. Suman Coburn is a 20 year old female who presents to clinic today with acute on chronic left knee pain.    Reviewed her knee x-ray in the room with her, this shows no obvious acute or chronic issues.    She does have pain at 2 specific locations, at the tibial tuberosity and the pes anserine bursa.  Her pes anserine bursa pain does seem chronic in nature.    She very well may have a bony contusion at each of these locations, we discussed  pathophysiology and treatment strategies.  I did offer her a wraparound brace today, she should wear this while standing at work and generally for comfort.  I also would like for her to obtain some over the shelf Voltaren gel and apply this as helpful.    If her pain is not improving over the coming 4 to 6 weeks we should follow-up, if this is the case I may consider MR imaging.    It was a pleasure seeing Miracle today.    Jonnie Palm DO, CAQSM  Primary Care Sports Medicine      This note was constructed using Dragon dictation software, please excuse any minor errors in spelling, grammar, or syntax.  NEW PATIENT INTAKE QUESTIONNAIRE  [facility] 6/28/2021      Miracle Coburn's chief complaint for this visit includes:  Chief Complaint   Patient presents with     Knee left     fell 1 month ago. Pain with kneeling     PCP: [facility]    Referring Provider:  FLORENTINO López CNP  [address] [place] [address]    There were no vitals taken for this visit.  Data Unavailable       Reason for Visit:    What part of your body is injured / painful?  left knee    What caused the injury /pain? Fall    How long ago did your injury occur or pain begin? about a month ago with some pain prior since 2012.     What are your most bothersome symptoms? Pain, Swelling and Clicking, popping    How would you characterize your symptom? dull    What makes your symptoms better? Ice    What makes your symptoms worse? Other: kneeling    Have you been previously seen for this problem? No      Social History:    Occupation: Baker    Handedness: Left    Exercise: Resistance    Review of Systems:    Have you recently had a a fever, chills, weight loss? No    Do you have any vision problems? No    Do you have any chest pain or edema? No    Do you have any shortness of breath or wheezing?  No    Do you have stomach problems? No    Do you have any urinary track issues? No    Do you have  any numbness or focal weakness? No    Do you have diabetes? No    Do you have problems with bleeding or clotting? No    Do you have an rashes or other skin lesions? No

## 2021-06-28 NOTE — LETTER
6/28/2021         RE: Miracle Coburn  [address] [facility] [address]        Dear Colleague,    Thank you for referring your patient, Miarcle Coburn, to the [facility] [place]. Please see a copy of my visit note below.    CHIEF COMPLAINT:  Knee left (fell 1 month ago. Pain with kneeling)       HISTORY OF PRESENT ILLNESS  Ms. Suman Coburn is a pleasant 20 year old female who presents to clinic today with left knee pain.  She is seen at the request Daria Sesay.   Miracle fell directly onto the anterior portion of her knee about a month ago.  She feels pain at the anterior and medial portion of her left knee, just below the knee joint.  Of note, Miracle did suffer a fall as a young girl, when she was 9 or 10 years old she fell through a wooden platform at a playground at school.  She had a massive amount of knee swelling and was diagnosed with a ruptured bursa.  She has had some pain in her medial knee at the pes anserine bursa since this time.        Additional history: as documented    MEDICAL HISTORY  Patient Active Problem List   Diagnosis     Health Care Home     Routine infant or child health check (WELL)     Acanthosis nigricans     Obesity, pediatric, BMI 95th to 98th percentile for age     Encounter for surveillance of injectable contraceptive     Chronic tonsillitis     Class 1 obesity due to excess calories with body mass index (BMI) of 31.0 to 31.9 in adult, unspecified whether serious comorbidity present     Family history of diabetes mellitus       Current Outpatient Medications   Medication Sig Dispense Refill     BIOTIN PO        calcium carbonate-vitamin D (OSCAL W/D) 500-200 MG-UNIT tablet Take 1 tablet by mouth 2 times daily 180 tablet 3     clindamycin-benzoyl peroxide (BENZACLIN) 1-5 % external gel Apply topically 2 times daily 50 g 3     ibuprofen (ADVIL/MOTRIN) 200 MG tablet Take 1-2 tablets (200-400 mg) by  mouth every 6 hours as needed for mild pain (Patient not taking: Reported on 3/31/2021) 50 tablet 1     medroxyPROGESTERone (DEPO-PROVERA) 150 MG/ML IM injection Inject 1 mL (150 mg) into the muscle every 3 months 1 mL 11     ondansetron (ZOFRAN-ODT) 8 MG ODT tab Take 1 tablet (8 mg) by mouth every 8 hours as needed for nausea (Patient not taking: Reported on 3/31/2021) 10 tablet 1     OYSCO 500 + D 500-200 MG-UNIT tablet Take 1 tablet by mouth 2 times daily         Allergies   Allergen Reactions     Seasonal Allergies        Family History   Problem Relation Age of Onset     Diabetes Mother      Hypertension Mother      Depression Mother      Anxiety Disorder Mother      Diabetes Paternal Grandmother        Additional medical/Social/Surgical histories reviewed in EPIC and updated as appropriate.        PHYSICAL EXAM  General  - normal appearance, in no obvious distress  HEENT  - conjunctivae not injected, moist mucous membranes  CV  - normal popliteal pulse  Pulm  - normal respiratory pattern, non-labored  Musculoskeletal - right knee  - stance: normal gait without limp  - inspection: no swelling or effusion, normal bone and joint alignment, no obvious deformity  - palpation: tender over tibial tuberosity, pes bursa  - ROM: 135 degrees flexion, -5 degrees extension, not painful, normal actively and passively compared to contralateral  - strength: 5/5 in flexion, 5/5 in extension  - special tests:  (-) Lachman  (-) Sandoval  (-) varus at 0 and 30 degrees flexion  (-) valgus at 0 and 30 degrees flexion    Neuro  - no sensory or motor deficit, grossly normal coordination, normal muscle tone  Skin  - no ecchymosis, erythema, warmth, or induration, no obvious rash  Psych  - interactive, appropriate, normal mood and affect                     ASSESSMENT & PLAN  Ms. Suman Coburn is a 20 year old female who presents to clinic today with acute on chronic right knee pain.    Reviewed her knee x-ray in the room with  her, this shows no obvious acute or chronic issues.    She does have pain at 2 specific locations, at the tibial tuberosity and the pes anserine bursa.  Her pes anserine bursa pain does seem chronic in nature.    She very well may have a bony contusion at each of these locations, we discussed pathophysiology and treatment strategies.  I did offer her a wraparound brace today, she should wear this while standing at work and generally for comfort.  I also would like for her to obtain some over the shelf Voltaren gel and apply this as helpful.    If her pain is not improving over the coming 4 to 6 weeks we should follow-up, if this is the case I may consider MR imaging.    It was a pleasure seeing Miracle today.    Jonnie Palm DO, CAM  Primary Care Sports Medicine      This note was constructed using Dragon dictation software, please excuse any minor errors in spelling, grammar, or syntax.  NEW PATIENT INTAKE QUESTIONNAIRE  [facility] 6/28/2021      Miracle Coburn's chief complaint for this visit includes:  Chief Complaint   Patient presents with     Knee left     fell 1 month ago. Pain with kneeling     PCP: Sia Kaba    Referring Provider:  FLORENTINO López CNP  [address] [place] [address]    There were no vitals taken for this visit.  Data Unavailable       Reason for Visit:    What part of your body is injured / painful?  left knee    What caused the injury /pain? Fall    How long ago did your injury occur or pain begin? about a month ago with some pain prior since 2012.     What are your most bothersome symptoms? Pain, Swelling and Clicking, popping    How would you characterize your symptom? dull    What makes your symptoms better? Ice    What makes your symptoms worse? Other: kneeling    Have you been previously seen for this problem? No      Social History:    Occupation: Baker    Handedness: Left    Exercise: Resistance    Review of  Systems:    Have you recently had a a fever, chills, weight loss? No    Do you have any vision problems? No    Do you have any chest pain or edema? No    Do you have any shortness of breath or wheezing?  No    Do you have stomach problems? No    Do you have any urinary track issues? No    Do you have any numbness or focal weakness? No    Do you have diabetes? No    Do you have problems with bleeding or clotting? No    Do you have an rashes or other skin lesions? No          Again, thank you for allowing me to participate in the care of your patient.        Sincerely,        Jonnie Palm, DO

## 2021-06-30 ENCOUNTER — THERAPY VISIT (OUTPATIENT)
Dept: PHYSICAL THERAPY | Facility: CLINIC | Age: 21
End: 2021-06-30
Payer: COMMERCIAL

## 2021-06-30 DIAGNOSIS — M54.2 NECK PAIN: ICD-10-CM

## 2021-06-30 DIAGNOSIS — M54.2 CERVICAL PAIN: ICD-10-CM

## 2021-06-30 PROCEDURE — 97112 NEUROMUSCULAR REEDUCATION: CPT | Mod: GP | Performed by: PHYSICAL THERAPIST

## 2021-06-30 PROCEDURE — 97161 PT EVAL LOW COMPLEX 20 MIN: CPT | Mod: GP | Performed by: PHYSICAL THERAPIST

## 2021-06-30 PROCEDURE — 97110 THERAPEUTIC EXERCISES: CPT | Mod: GP | Performed by: PHYSICAL THERAPIST

## 2021-06-30 NOTE — PROGRESS NOTES
Physical Therapy Initial Evaluation  Subjective:    Patient Health History  Miracle Coburn being seen for Neck pain.     Problem began: 6/15/2021 (MD appointment).   Problem occurred: Gradually worsening over the past month   Pain is reported as 5/10 on pain scale.  General health as reported by patient is good.  Pertinent medical history includes: asthma and overweight.   Red flags:  None as reported by patient.  Medical allergies: none.   Surgeries include:  Other. Other surgery history details: Tonsil removal.    Current medications:  Other.    Current occupation is [employer] .   Primary job tasks include:  Lifting/carrying, driving, prolonged standing and repetitive tasks.                  Therapist Generated HPI Evaluation  Problem details: Pt presents to clinic with complaints of bilateral neck pain over the past month. Pt has recently started new job as baker, spending long hours performing repetitive lifting, mixing, and bending forward. Pt noticing increased pain after work and with prolonged sitting.  Pt had MD appointment on 6/15/2021 and referred to PT. .         Type of problem:  Cervical spine.    This is a new condition.  Condition occurred with:  Repetition/overuse.  Where condition occurred: for unknown reasons.  Patient reports pain:  Cervical right side, cervical left side and lower cervical spine.  Pain is described as aching and is intermittent.  Pain radiates to:  Head. Pain is the same all the time (activity dependent).  Since onset symptoms are gradually worsening.  Associated symptoms:  Headache and loss of motion/stiffness. Symptoms are exacerbated by rotating head, looking up or down, certain positions and driving  and relieved by rest, activity/movement and heat.  Imaging testing: none.  Past treatment: none. There was none improvement following previous treatment.  Restrictions due to condition include:  Working in normal job without restrictions.  Barriers include:  None as  reported by patient.                        Objective:  Standing Alignment:    Cervical/Thoracic:  Forward head  Shoulder/UE:  Rounded shoulders                                  Cervical/Thoracic Evaluation    AROM:  AROM Cervical:    Flexion:            WNL -pain  Extension:       50% +pain  Rotation:         Left: 50 deg     Right: 60 deg  Side Bend:      Left: 20 deg     Right:  15 deg +pain R      Headaches: cervical  Cervical Myotomes:  normal                      Cervical Dermatomes:  normal                    Cervical Palpation:      Tenderness not present at Left:   Upper Trap; Levator; Erector Spinae or Suboccipitals  Tenderness present at Right:    Upper Trap; Levator; Erector Spinae and Suboccipitals      Spinal Segmental Conclusions:    Level:  Hypo at T7 and T8                                                General     ROS    Assessment/Plan:    Patient is a 20 year old female with cervical complaints.    Patient has the following significant findings with corresponding treatment plan.                Diagnosis 1:  Cervical pain  Pain -  hot/cold therapy, manual therapy, self management, education and home program  Decreased ROM/flexibility - manual therapy, therapeutic exercise, therapeutic activity and home program  Decreased strength - therapeutic exercise, therapeutic activities and home program  Impaired muscle performance - neuro re-education and home program  Decreased function - therapeutic activities and home program  Impaired posture - neuro re-education, therapeutic activities and home program    Therapy Evaluation Codes:   1) History comprised of:   Personal factors that impact the plan of care:      None.    Comorbidity factors that impact the plan of care are:      Asthma and Overweight.     Medications impacting care: None.  2) Examination of Body Systems comprised of:   Body structures and functions that impact the plan of care:      Cervical spine.   Activity limitations that impact the  plan of care are:      Cooking, Lifting and Reading/Computer work.  3) Clinical presentation characteristics are:   Stable/Uncomplicated.  4) Decision-Making    Low complexity using standardized patient assessment instrument and/or measureable assessment of functional outcome.  Cumulative Therapy Evaluation is: Low complexity.    Previous and current functional limitations:  (See Goal Flow Sheet for this information)    Short term and Long term goals: (See Goal Flow Sheet for this information)     Communication ability:  Patient appears to be able to clearly communicate and understand verbal and written communication and follow directions correctly.  Treatment Explanation - The following has been discussed with the patient:   RX ordered/plan of care  Anticipated outcomes  Possible risks and side effects  This patient would benefit from PT intervention to resume normal activities.   Rehab potential is good.    Frequency:  1 X week, once daily  Duration:  for 8 weeks  Discharge Plan:  Achieve all LTG.  Independent in home treatment program.  Return to previous functional level by discharge.  Reach maximal therapeutic benefit.    Please refer to the daily flowsheet for treatment today, total treatment time and time spent performing 1:1 timed codes.

## 2021-07-07 ENCOUNTER — THERAPY VISIT (OUTPATIENT)
Dept: PHYSICAL THERAPY | Facility: CLINIC | Age: 21
End: 2021-07-07
Payer: COMMERCIAL

## 2021-07-07 DIAGNOSIS — M54.2 CERVICAL PAIN: ICD-10-CM

## 2021-07-07 PROCEDURE — 97112 NEUROMUSCULAR REEDUCATION: CPT | Mod: GP | Performed by: PHYSICAL THERAPIST

## 2021-07-07 PROCEDURE — 97110 THERAPEUTIC EXERCISES: CPT | Mod: GP | Performed by: PHYSICAL THERAPIST

## 2021-07-07 PROCEDURE — 97140 MANUAL THERAPY 1/> REGIONS: CPT | Mod: GP | Performed by: PHYSICAL THERAPIST

## 2021-07-28 ENCOUNTER — THERAPY VISIT (OUTPATIENT)
Dept: PHYSICAL THERAPY | Facility: CLINIC | Age: 21
End: 2021-07-28
Payer: COMMERCIAL

## 2021-07-28 DIAGNOSIS — M54.2 CERVICAL PAIN: ICD-10-CM

## 2021-07-28 PROCEDURE — 97110 THERAPEUTIC EXERCISES: CPT | Mod: GP | Performed by: PHYSICAL THERAPIST

## 2021-07-28 PROCEDURE — 97140 MANUAL THERAPY 1/> REGIONS: CPT | Mod: GP | Performed by: PHYSICAL THERAPIST

## 2021-07-28 PROCEDURE — 97112 NEUROMUSCULAR REEDUCATION: CPT | Mod: GP | Performed by: PHYSICAL THERAPIST

## 2021-07-28 NOTE — PROGRESS NOTES
Subjective:  HPI  Physical Exam                    Objective:  System    Physical Exam    General     ROS    Assessment/Plan:    PROGRESS  REPORT    Progress reporting period is from 6/30/2021 to 7/28/2021. Progress report on 7/28/2021 equal to discharge summary.     SUBJECTIVE  Subjective: Miracle feeling significant improvement in neck pain at this time. Was able to take 4 days off work, helping to decrease pain and stiffness. Was able to drive to [place] and back without difficulty. Pt will continue with HEP independently at this time.    Current Pain level: 1/10   Initial Pain level: 6/10   Changes in function: Yes, see goal flow sheet for change in function   Adverse reactions: None;   ,         OBJECTIVE  Objective: Cervical AROM: extension 75% -pain, flexion WNL slight pain, R rotation 80 deg, L rotation 75 deg, bilateral SB 30 deg      ASSESSMENT/PLAN  Updated problem list and treatment plan: Diagnosis 1:  Cervical pain  Pain -  hot/cold therapy, manual therapy, self management, education and home program  Decreased ROM/flexibility - manual therapy, therapeutic exercise, therapeutic activity and home program  Decreased strength - therapeutic exercise, therapeutic activities and home program  Impaired muscle performance - neuro re-education and home program  Decreased function - therapeutic activities and home program  Impaired posture - neuro re-education, therapeutic activities and home program  STG/LTGs have been met or progress has been made towards goals:  Yes (See Goal flow sheet completed today.)  Assessment of Progress: The patient's condition is improving.  Self Management Plans:  Patient has been instructed in a home treatment program.  Patient is independent in a home treatment program.  Patient  has been instructed in self management of symptoms.  I have re-evaluated this patient and find that the nature, scope, duration and intensity of the therapy is appropriate for the medical condition of the  patient.  Miracle continues to require the following intervention to meet STG and LTG's: PT intervention is no longer required to meet STG/LTG.  We will discharge this patient from PT.    Recommendations:  This patient is ready to be discharged from therapy and continue their home treatment program.    Please refer to the daily flowsheet for treatment today, total treatment time and time spent performing 1:1 timed codes.

## 2021-09-10 PROBLEM — M54.2 CERVICAL PAIN: Status: RESOLVED | Noted: 2021-06-30 | Resolved: 2021-09-10

## 2021-09-22 ENCOUNTER — OFFICE VISIT (OUTPATIENT)
Dept: URGENT CARE | Facility: URGENT CARE | Age: 21
End: 2021-09-22
Payer: COMMERCIAL

## 2021-09-22 ENCOUNTER — TELEPHONE (OUTPATIENT)
Dept: FAMILY MEDICINE | Facility: CLINIC | Age: 21
End: 2021-09-22

## 2021-09-22 VITALS
RESPIRATION RATE: 20 BRPM | DIASTOLIC BLOOD PRESSURE: 79 MMHG | WEIGHT: 205.56 LBS | BODY MASS INDEX: 32.68 KG/M2 | SYSTOLIC BLOOD PRESSURE: 120 MMHG | OXYGEN SATURATION: 100 % | HEART RATE: 84 BPM | TEMPERATURE: 98.3 F

## 2021-09-22 DIAGNOSIS — J34.0 CELLULITIS OF NASAL TIP: Primary | ICD-10-CM

## 2021-09-22 PROCEDURE — 99214 OFFICE O/P EST MOD 30 MIN: CPT | Performed by: PHYSICIAN ASSISTANT

## 2021-09-22 RX ORDER — CEPHALEXIN 500 MG/1
500 CAPSULE ORAL 3 TIMES DAILY
Qty: 21 CAPSULE | Refills: 0 | Status: SHIPPED | OUTPATIENT
Start: 2021-09-22 | End: 2021-09-29

## 2021-09-22 ASSESSMENT — ENCOUNTER SYMPTOMS
COUGH: 0
CARDIOVASCULAR NEGATIVE: 1
FEVER: 0
FATIGUE: 0
CONSTITUTIONAL NEGATIVE: 1
CHILLS: 0
SINUS PAIN: 0
WOUND: 1
WHEEZING: 0
PALPITATIONS: 0
SORE THROAT: 0
RHINORRHEA: 1
SINUS PRESSURE: 0
SHORTNESS OF BREATH: 0
GASTROINTESTINAL NEGATIVE: 1
COLOR CHANGE: 1
RESPIRATORY NEGATIVE: 1

## 2021-09-22 NOTE — PROGRESS NOTES
Subjective   Miracle is a 20 year old who presents for the following health issues   HPI   Concern - nose piercing infection  Onset: yesterday  Description:  Developed redness, swelling, tenderness, and drainage from the left nostril where her piercing is present.  She has since removed her piercing.  Intensity: moderate  Progression of Symptoms:  same  Accompanying Signs & Symptoms:  No radicular pain, numbness, tingling or weakness. No fevers.  No new piercing. No injury or trauma.   Previous history of similar problem: none  Precipitating factors:        Worsened by: none  Alleviating factors:        Improved by: none  Therapies tried and outcome: warm compresses, removing piercing with some relief    Patient Active Problem List   Diagnosis     Health Care Home     Routine infant or child health check (WELL)     Acanthosis nigricans     Obesity, pediatric, BMI 95th to 98th percentile for age     Encounter for surveillance of injectable contraceptive     Chronic tonsillitis     Class 1 obesity due to excess calories with body mass index (BMI) of 31.0 to 31.9 in adult, unspecified whether serious comorbidity present     Family history of diabetes mellitus     Current Outpatient Medications   Medication     BIOTIN PO     calcium carbonate-vitamin D (OSCAL W/D) 500-200 MG-UNIT tablet     clindamycin-benzoyl peroxide (BENZACLIN) 1-5 % external gel     ibuprofen (ADVIL/MOTRIN) 200 MG tablet     medroxyPROGESTERone (DEPO-PROVERA) 150 MG/ML IM injection     ondansetron (ZOFRAN-ODT) 8 MG ODT tab     OYSCO 500 + D 500-200 MG-UNIT tablet     Current Facility-Administered Medications   Medication     medroxyPROGESTERone (DEPO-PROVERA) injection 150 mg        Allergies   Allergen Reactions     Seasonal Allergies        Review of Systems   Constitutional: Negative.  Negative for chills, fatigue and fever.   HENT: Positive for congestion and rhinorrhea. Negative for ear discharge, ear pain, hearing loss, sinus pressure, sinus  pain and sore throat.    Respiratory: Negative.  Negative for cough, shortness of breath and wheezing.    Cardiovascular: Negative.  Negative for chest pain, palpitations and peripheral edema.   Gastrointestinal: Negative.    Skin: Positive for color change and wound. Negative for pallor and rash.   All other systems reviewed and are negative.           Objective    /79 (BP Location: Left arm, Patient Position: Sitting, Cuff Size: Adult Large)   Pulse 84   Temp 98.3  F (36.8  C) (Oral)   Resp 20   Wt 93.2 kg (205 lb 9 oz)   SpO2 100%   BMI 32.68 kg/m    Body mass index is 32.68 kg/m .  Physical Exam  Vitals and nursing note reviewed.   Constitutional:       General: She is not in acute distress.     Appearance: Normal appearance. She is well-developed and normal weight. She is not ill-appearing.   HENT:      Head: Normocephalic and atraumatic.      Comments: TMs are intact without any erythema or bulging bilaterally.  Airway is patent.     Nose: Nasal tenderness (mild erythema present over the L nostril where previous piercing was present.  no discharge present) and rhinorrhea present.      Mouth/Throat:      Mouth: Mucous membranes are moist.      Pharynx: Uvula midline. No pharyngeal swelling, oropharyngeal exudate or posterior oropharyngeal erythema.      Tonsils: No tonsillar exudate.   Eyes:      General: No scleral icterus.     Extraocular Movements: Extraocular movements intact.      Conjunctiva/sclera: Conjunctivae normal.      Pupils: Pupils are equal, round, and reactive to light.   Neck:      Thyroid: No thyromegaly.   Cardiovascular:      Rate and Rhythm: Normal rate and regular rhythm.      Pulses: Normal pulses.      Heart sounds: Normal heart sounds, S1 normal and S2 normal. No murmur heard.   No friction rub. No gallop.    Pulmonary:      Effort: Pulmonary effort is normal. No accessory muscle usage, respiratory distress or retractions.      Breath sounds: Normal breath sounds and air  entry. No stridor. No decreased breath sounds, wheezing, rhonchi or rales.   Musculoskeletal:      Cervical back: Normal range of motion and neck supple.   Lymphadenopathy:      Cervical: No cervical adenopathy.   Skin:     General: Skin is warm and dry.      Capillary Refill: Capillary refill takes less than 2 seconds.      Nails: There is no clubbing.   Neurological:      Mental Status: She is alert and oriented to person, place, and time.   Psychiatric:         Mood and Affect: Mood normal.         Behavior: Behavior normal.         Thought Content: Thought content normal.         Judgment: Judgment normal.          Assessment/Plan:  Cellulitis of nasal tip:  Will treat with keflex and [id number].  Recommend tylenol/ibuprofen prn pain/fever and refrain from wearing a nose piercing until infection resolves.   Rest, fluids, chicken soup.  Recheck in clinic if symptoms worsen or if symptoms do not improve.     -     cephALEXin (KEFLEX) 500 MG capsule; Take 1 capsule (500 mg) by mouth 3 times daily for 7 days  -     mupirocin (BACTROBAN) 2 % nasal ointment; Spray 1 g into both nostrils 2 times daily for 7 days        Darlene Medrano PA-C

## 2021-09-23 RX ORDER — MUPIROCIN 20 MG/G
OINTMENT TOPICAL 3 TIMES DAILY
Qty: 30 G | Refills: 0 | Status: SHIPPED | OUTPATIENT
Start: 2021-09-23 | End: 2021-09-30

## 2021-09-23 NOTE — TELEPHONE ENCOUNTER
Fax from pharmacy:  Please verify ointment is ok to apply inside nose. No spray is available. Directions state to spray inside the nostril

## 2021-09-23 NOTE — TELEPHONE ENCOUNTER
The mupirocin is no longer available in this preparation. They are wondering if a new Rx can be sent or given the topical. If topical they need a new change to the sig line.    Cary Alfaro RN, [facility] Triage

## 2021-12-01 ENCOUNTER — ALLIED HEALTH/NURSE VISIT (OUTPATIENT)
Dept: FAMILY MEDICINE | Facility: CLINIC | Age: 21
End: 2021-12-01
Payer: COMMERCIAL

## 2021-12-01 DIAGNOSIS — Z30.9 CONTRACEPTIVE MANAGEMENT: Primary | ICD-10-CM

## 2021-12-01 LAB — HCG UR QL: NEGATIVE

## 2021-12-01 PROCEDURE — 99207 PR NO CHARGE NURSE ONLY: CPT

## 2021-12-01 PROCEDURE — 96372 THER/PROPH/DIAG INJ SC/IM: CPT | Performed by: FAMILY MEDICINE

## 2021-12-01 PROCEDURE — 81025 URINE PREGNANCY TEST: CPT

## 2021-12-01 RX ADMIN — MEDROXYPROGESTERONE ACETATE 150 MG: 150 INJECTION, SUSPENSION INTRAMUSCULAR at 14:09

## 2021-12-01 NOTE — PROGRESS NOTES
BP: Data Unavailable    LAST PAP/EXAM:   Lab Results   Component Value Date    PAP NIL 03/31/2021     URINE HCG:negative    The following medication was given:     MEDICATION: Depo Provera 150mg  ROUTE: IM  SITE: Ventrogluteal - Left  : [employer]   LOT #: [id number]  EXP:04/2023  NEXT INJECTION DUE: 2/16/22 - 3/2/22   NDC: 16775-230-56

## 2022-03-05 ENCOUNTER — HEALTH MAINTENANCE LETTER (OUTPATIENT)
Age: 22
End: 2022-03-05

## 2022-04-30 ENCOUNTER — HEALTH MAINTENANCE LETTER (OUTPATIENT)
Age: 22
End: 2022-04-30

## 2022-08-30 ENCOUNTER — OFFICE VISIT (OUTPATIENT)
Dept: FAMILY MEDICINE | Facility: CLINIC | Age: 22
End: 2022-08-30
Payer: COMMERCIAL

## 2022-08-30 VITALS
WEIGHT: 214 LBS | RESPIRATION RATE: 16 BRPM | HEART RATE: 66 BPM | BODY MASS INDEX: 34.39 KG/M2 | DIASTOLIC BLOOD PRESSURE: 63 MMHG | HEIGHT: 66 IN | OXYGEN SATURATION: 99 % | TEMPERATURE: 97.5 F | SYSTOLIC BLOOD PRESSURE: 102 MMHG

## 2022-08-30 DIAGNOSIS — R11.0 NAUSEA: ICD-10-CM

## 2022-08-30 DIAGNOSIS — N91.2 AMENORRHEA: ICD-10-CM

## 2022-08-30 DIAGNOSIS — E66.811 CLASS 1 OBESITY DUE TO EXCESS CALORIES WITH BODY MASS INDEX (BMI) OF 31.0 TO 31.9 IN ADULT, UNSPECIFIED WHETHER SERIOUS COMORBIDITY PRESENT: ICD-10-CM

## 2022-08-30 DIAGNOSIS — E66.09 CLASS 1 OBESITY DUE TO EXCESS CALORIES WITH BODY MASS INDEX (BMI) OF 31.0 TO 31.9 IN ADULT, UNSPECIFIED WHETHER SERIOUS COMORBIDITY PRESENT: ICD-10-CM

## 2022-08-30 DIAGNOSIS — Z00.00 ROUTINE GENERAL MEDICAL EXAMINATION AT A HEALTH CARE FACILITY: Primary | ICD-10-CM

## 2022-08-30 DIAGNOSIS — Z11.3 SCREENING FOR STDS (SEXUALLY TRANSMITTED DISEASES): ICD-10-CM

## 2022-08-30 LAB
CHOLEST SERPL-MCNC: 134 MG/DL
FASTING STATUS PATIENT QL REPORTED: NO
HCG SERPL QL: NEGATIVE
HDLC SERPL-MCNC: 35 MG/DL
LDLC SERPL CALC-MCNC: 75 MG/DL
NONHDLC SERPL-MCNC: 99 MG/DL
TRIGL SERPL-MCNC: 118 MG/DL
TSH SERPL DL<=0.005 MIU/L-ACNC: 2.23 MU/L (ref 0.4–4)

## 2022-08-30 PROCEDURE — 91305 COVID-19,PF,PFIZER (12+ YRS): CPT | Performed by: FAMILY MEDICINE

## 2022-08-30 PROCEDURE — 87491 CHLMYD TRACH DNA AMP PROBE: CPT | Performed by: FAMILY MEDICINE

## 2022-08-30 PROCEDURE — 84443 ASSAY THYROID STIM HORMONE: CPT | Performed by: FAMILY MEDICINE

## 2022-08-30 PROCEDURE — 80061 LIPID PANEL: CPT | Performed by: FAMILY MEDICINE

## 2022-08-30 PROCEDURE — 84703 CHORIONIC GONADOTROPIN ASSAY: CPT | Performed by: FAMILY MEDICINE

## 2022-08-30 PROCEDURE — 0054A COVID-19,PF,PFIZER (12+ YRS): CPT | Performed by: FAMILY MEDICINE

## 2022-08-30 PROCEDURE — 36415 COLL VENOUS BLD VENIPUNCTURE: CPT | Performed by: FAMILY MEDICINE

## 2022-08-30 PROCEDURE — 99395 PREV VISIT EST AGE 18-39: CPT | Mod: 25 | Performed by: FAMILY MEDICINE

## 2022-08-30 PROCEDURE — 99213 OFFICE O/P EST LOW 20 MIN: CPT | Mod: 25 | Performed by: FAMILY MEDICINE

## 2022-08-30 RX ORDER — ONDANSETRON 4 MG/1
4 TABLET, FILM COATED ORAL EVERY 8 HOURS PRN
Qty: 30 TABLET | Refills: 0 | Status: SHIPPED | OUTPATIENT
Start: 2022-08-30 | End: 2022-09-29

## 2022-08-30 ASSESSMENT — ENCOUNTER SYMPTOMS
DIARRHEA: 0
HEMATURIA: 0
ARTHRALGIAS: 0
HEADACHES: 0
WEAKNESS: 0
PALPITATIONS: 0
SHORTNESS OF BREATH: 0
SORE THROAT: 0
BREAST MASS: 0
FEVER: 0
DIZZINESS: 0
COUGH: 0
ABDOMINAL PAIN: 1
CONSTIPATION: 0
HEARTBURN: 0
JOINT SWELLING: 1
HEMATOCHEZIA: 0
EYE PAIN: 0
PARESTHESIAS: 0
FREQUENCY: 0
DYSURIA: 0
NAUSEA: 1
MYALGIAS: 0
CHILLS: 0

## 2022-08-30 ASSESSMENT — PAIN SCALES - GENERAL: PAINLEVEL: NO PAIN (0)

## 2022-08-30 NOTE — PROGRESS NOTES
SUBJECTIVE:   CC: Miracle Coburn is an 21 year old woman who presents for preventive health visit.       Patient has been advised of split billing requirements and indicates understanding: Yes  Healthy Habits:     Getting at least 3 servings of Calcium per day:  NO    Bi-annual eye exam:  Yes    Dental care twice a year:  Yes    Sleep apnea or symptoms of sleep apnea:  Daytime drowsiness    Diet:  Regular (no restrictions) and Breakfast skipped    Frequency of exercise:  2-3 days/week    Duration of exercise:  15-30 minutes    Taking medications regularly:  Yes    PHQ-2 Total Score: 0    Additional concerns today:  Yes    Miracle has been feeling nauseous for the past 2 weeks.  No vomiting.  Denied any fever/chest pain/dysuria/diarrhea/rashes.Her last Depo was in Feb 2022 , currently sexually active .  She also has history of fatigue, and snoring.  No family/prior history of NUNU.  Does not wake up at night with breathing issues.      Today's PHQ-2 Score:   PHQ-2 ( 1999 [employer]) 8/30/2022   Q1: Little interest or pleasure in doing things 0   Q2: Feeling down, depressed or hopeless 0   PHQ-2 Score 0   PHQ-2 Total Score (12-17 Years)- Positive if 3 or more points; Administer PHQ-A if positive -   Q1: Little interest or pleasure in doing things Not at all   Q2: Feeling down, depressed or hopeless Not at all   PHQ-2 Score 0       Abuse: Current or Past (Physical, Sexual or Emotional) - No  Do you feel safe in your environment? Yes        Social History     Tobacco Use     Smoking status: Never Smoker     Smokeless tobacco: Never Used   Substance Use Topics     Alcohol use: No       Alcohol Use 8/30/2022   Prescreen: >3 drinks/day or >7 drinks/week? No       Reviewed orders with patient.  Reviewed health maintenance and updated orders accordingly - Yes  Labs reviewed in EPIC    Breast Cancer Screening:        History of abnormal Pap smear: NO - age 21-29 PAP every 3 years recommended  PAP / HPV 3/31/2021    PAP (Historical) NIL     Reviewed and updated as needed this visit by clinical staff                    Reviewed and updated as needed this visit by Provider                   History reviewed. No pertinent past medical history.     Review of Systems   Constitutional: Negative for chills and fever.   HENT: Negative for congestion, ear pain, hearing loss and sore throat.    Eyes: Negative for pain and visual disturbance.   Respiratory: Negative for cough and shortness of breath.    Cardiovascular: Negative for chest pain, palpitations and peripheral edema.   Gastrointestinal: Positive for abdominal pain and nausea. Negative for constipation, diarrhea, heartburn and hematochezia.   Breasts:  Negative for tenderness, breast mass and discharge.   Genitourinary: Negative for dysuria, frequency, genital sores, hematuria, pelvic pain, urgency, vaginal bleeding and vaginal discharge.   Musculoskeletal: Positive for joint swelling. Negative for arthralgias and myalgias.   Skin: Negative for rash.   Neurological: Negative for dizziness, weakness, headaches and paresthesias.   Psychiatric/Behavioral: Negative for mood changes.     CONSTITUTIONAL: NEGATIVE for fever, chills, change in weight  INTEGUMENTARU/SKIN: NEGATIVE for worrisome rashes, moles or lesions  EYES: NEGATIVE for vision changes or irritation  ENT: NEGATIVE for ear, mouth and throat problems  RESP: NEGATIVE for significant cough or SOB  BREAST: NEGATIVE for masses, tenderness or discharge  CV: NEGATIVE for chest pain, palpitations or peripheral edema  GI: NEGATIVE for nausea, abdominal pain, heartburn, or change in bowel habits  : NEGATIVE for unusual urinary or vaginal symptoms. Periods are regular.  MUSCULOSKELETAL: NEGATIVE for significant arthralgias or myalgia  NEURO: NEGATIVE for weakness, dizziness or paresthesias  PSYCHIATRIC: NEGATIVE for changes in mood or affect     OBJECTIVE:   There were no vitals taken for this visit.  Physical Exam  GENERAL:  healthy, alert and no distress  NECK: no adenopathy, no asymmetry, masses, or scars and thyroid normal to palpation  RESP: lungs clear to auscultation - no rales, rhonchi or wheezes  CV: regular rate and rhythm, normal S1 S2, no S3 or S4, no murmur, click or rub, no peripheral edema and peripheral pulses strong  ABDOMEN: soft, nontender, no hepatosplenomegaly, no masses and bowel sounds normal  MS: no gross musculoskeletal defects noted, no edema    Diagnostic Test Results:  Labs reviewed in Epic    ASSESSMENT/PLAN:   (Z00.00) Routine general medical examination at a health care facility  (primary encounter diagnosis)  Comment: Pap normal 2021.  Routine screening every 3 years until age 30.  BMI elevated at 34.Received COVID second shot today.  Plan: Importance of healthy diet and physical activity discussed.Miracle is working on improving her weight and physical activity.    (Z11.3) Screening for STDs (sexually transmitted diseases)  Comment: Routine screening.  No complaints.  Plan: CHLAMYDIA TRACHOMATIS PCR            (E66.09,  Z68.31) Class 1 obesity due to excess calories with body mass index (BMI) of 31.0 to 31.9 in adult, unspecified whether serious comorbidity present    Plan: Lipid panel reflex to direct LDL Fasting, TSH         with free T4 reflex          (R11.0) Nausea  Comment: Nausea for 2 weeks.  Last Depo shot February 2022(been 6 months) .  Discussed other contraceptive options including Nexplanon.Denied any clinical signs of infection/bowel/bladder issues contributing to nausea.  Plan: Pregnancy test ordered.  If negative, she needs to be back on some form of contraception and follow up in the clinic if nausea persists.  HCG qualitative, ondansetron (ZOFRAN) 4 MG         tablet           (N91.2) Amenorrhea  Comment: Last Depo shot 6 months ago.  Sometimes regular menstrual cycle return can take up to 9 months.  Explained to the patient that if pregnancy test is negative and if she does not get her  "menstrual cycle after 4-5 months, she will need to follow-up in the clinic for further evaluation.    Plan: HCG qualitative, ondansetron (ZOFRAN) 4 MG         tablet              Patient has been advised of split billing requirements and indicates understanding: Yes    COUNSELING:  Reviewed preventive health counseling, as reflected in patient instructions       Regular exercise       Healthy diet/nutrition    Estimated body mass index is 32.68 kg/m  as calculated from the following:    Height as of 6/15/21: 1.689 m (5' 6.5\").    Weight as of 9/22/21: 93.2 kg (205 lb 9 oz).    Weight management plan: Discussed healthy diet and exercise guidelines    She reports that she has never smoked. She has never used smokeless tobacco.        Counseling Resources:  ATP IV Guidelines  Pooled Cohorts Equation Calculator  Breast Cancer Risk Calculator  BRCA-Related Cancer Risk Assessment: FHS-7 Tool  FRAX Risk Assessment  ICSI Preventive Guidelines  Dietary Guidelines for Americans, 2010  USDA's MyPlate  ASA Prophylaxis  Lung CA Screening    Nahomy Valiente MD  [facility]  "

## 2022-08-31 LAB — C TRACH DNA SPEC QL NAA+PROBE: NEGATIVE

## 2022-11-07 ENCOUNTER — TELEPHONE (OUTPATIENT)
Dept: FAMILY MEDICINE | Facility: CLINIC | Age: 22
End: 2022-11-07

## 2022-11-07 DIAGNOSIS — Z30.42 ENCOUNTER FOR SURVEILLANCE OF INJECTABLE CONTRACEPTIVE: Primary | ICD-10-CM

## 2022-11-07 NOTE — TELEPHONE ENCOUNTER
Patient in clinic on 8/30/22 and would like to resume medroxyPROGESTERone (DEPO-PROVERA) 150 MG/ML IM injection. Are you willing to take over this medication? Patient discussed in clinic needing a pregnancy test before receiving injection. Can you place orders for pregnancy test? Please route message to [facility], to set up lab appointment for patient once orders are placed.     Miracle would like to be called at [phone].    Winsome Lindsey RN

## 2022-11-07 NOTE — TELEPHONE ENCOUNTER
Writer attempted to contact patient in regards to provider message below. Patient was unavailable and a voicemail message was left to contact the [facility] at [phone] and ask to speak with a nurse.     If the patient calls back please relay provider message below. Please route to  to assist in scheduling lab appointment once message is relayed.       Nahomy June MD [facility] 1 hour ago (1:07 PM)     CN  Hi there,   Yes,she can start taking Depo shot after ruling out pregnancy.  Pregnancy test has been ordered.   Thanks.   -MD Valery Mabry RN, BSN  [facility]

## 2022-11-08 NOTE — TELEPHONE ENCOUNTER
Called patient to relay provider message below re: needing urine pregnancy test prior to re-starting Depo. Patient verbalized understanding, lab appt made for this Friday. Patient has [facility] appt for Depo shot on 11/14/22. Patient has no further questions or concerns at this time.      Lori Boo, PERIN, RN  [facility]

## 2022-11-10 ENCOUNTER — LAB (OUTPATIENT)
Dept: LAB | Facility: CLINIC | Age: 22
End: 2022-11-10
Payer: COMMERCIAL

## 2022-11-10 DIAGNOSIS — Z30.42 ENCOUNTER FOR SURVEILLANCE OF INJECTABLE CONTRACEPTIVE: ICD-10-CM

## 2022-11-10 LAB — HCG UR QL: NEGATIVE

## 2022-11-10 PROCEDURE — 81025 URINE PREGNANCY TEST: CPT

## 2022-11-19 ENCOUNTER — HEALTH MAINTENANCE LETTER (OUTPATIENT)
Age: 22
End: 2022-11-19

## 2023-02-27 ENCOUNTER — NURSE TRIAGE (OUTPATIENT)
Dept: NURSING | Facility: CLINIC | Age: 23
End: 2023-02-27
Payer: COMMERCIAL

## 2023-02-27 NOTE — TELEPHONE ENCOUNTER
"Patient reporting stopping Depo Shot  August 2022.    Reporting in past 2 1/2 months she has been bleeding daily.     \"Feels like a period.\"     Patient is sexually active. Stating she took a home pregnancy test that was negative in past 2 weeks.    Soaking approximately 6 tampons/or pads in 24 hours, similar to menses.    Disposition to see provider with in 2 weeks. Transferred to Central Scheduling.    Caller verbalized understanding. Denies further questions.    Gudelia Main RN  [facility]        Reason for Disposition    Periods last > 7 days    Additional Information    Negative: SEVERE vaginal bleeding (e.g., continuous red blood from vagina, or large blood clots) and very weak (can't stand)    Negative: Passed out (i.e., fainted, collapsed and was not responding)    Negative: Difficult to awaken or acting confused (e.g., disoriented, slurred speech)    Negative: Shock suspected (e.g., cold/pale/clammy skin, too weak to stand, low BP, rapid pulse)    Negative: Sounds like a life-threatening emergency to the triager    Negative: SEVERE abdominal pain (e.g., excruciating)    Negative: SEVERE dizziness (e.g., unable to stand, requires support to walk, feels like passing out now)    Negative: SEVERE vaginal bleeding (e.g., soaking 2 pads or tampons per hour and present 2 or more hours; 1 menstrual cup every 2 hours)    Negative: MODERATE vaginal bleeding (i.e., soaking pad or tampon per hour and present > 6 hours; 1 menstrual cup every 6 hours)    Negative: Pale skin (pallor) of new-onset or worsening    Negative: Constant abdominal pain lasting > 2 hours    Negative: Patient sounds very sick or weak to the triager    Negative: Taking Coumadin (warfarin) or other strong blood thinner, or known bleeding disorder (e.g., thrombocytopenia)    Negative: Skin bruises or nosebleed and not caused by an injury    Negative: Bleeding/spotting after procedure (e.g., biopsy) or pelvic examination (e.g., pap " smear) that persists > 3 days    Negative: Patient wants to be seen    Negative: Passed tissue (e.g., gray-white)    Negative: Periods with > 6 soaked pads or tampons per day    Negative: Pregnant 20 or more weeks (5 months or more)    Negative: Pregnant < 20 weeks (less than 5 months)    Negative: Postpartum (from 0 to 6 weeks after delivery)    Negative: Vaginal discharge is main symptom and bleeding is slight    Protocols used: VAGINAL BLEEDING - [id number]

## 2023-03-10 ENCOUNTER — OFFICE VISIT (OUTPATIENT)
Dept: FAMILY MEDICINE | Facility: CLINIC | Age: 23
End: 2023-03-10
Payer: COMMERCIAL

## 2023-03-10 VITALS
RESPIRATION RATE: 19 BRPM | BODY MASS INDEX: 34.23 KG/M2 | DIASTOLIC BLOOD PRESSURE: 75 MMHG | HEART RATE: 81 BPM | WEIGHT: 213 LBS | TEMPERATURE: 98.9 F | OXYGEN SATURATION: 98 % | SYSTOLIC BLOOD PRESSURE: 112 MMHG

## 2023-03-10 DIAGNOSIS — R10.13 EPIGASTRIC PAIN: ICD-10-CM

## 2023-03-10 DIAGNOSIS — N92.6 IRREGULAR PERIODS: Primary | ICD-10-CM

## 2023-03-10 DIAGNOSIS — R42 DIZZINESS: ICD-10-CM

## 2023-03-10 PROBLEM — Z30.42 ENCOUNTER FOR SURVEILLANCE OF INJECTABLE CONTRACEPTIVE: Status: RESOLVED | Noted: 2018-10-16 | Resolved: 2023-03-10

## 2023-03-10 PROBLEM — E66.9 OBESITY, PEDIATRIC, BMI 95TH TO 98TH PERCENTILE FOR AGE: Status: RESOLVED | Noted: 2017-07-26 | Resolved: 2023-03-10

## 2023-03-10 LAB
BASOPHILS # BLD AUTO: 0 10E3/UL (ref 0–0.2)
BASOPHILS NFR BLD AUTO: 0 %
EOSINOPHIL # BLD AUTO: 0.2 10E3/UL (ref 0–0.7)
EOSINOPHIL NFR BLD AUTO: 2 %
ERYTHROCYTE [DISTWIDTH] IN BLOOD BY AUTOMATED COUNT: 12.6 % (ref 10–15)
HBA1C MFR BLD: 5.4 % (ref 0–5.6)
HCT VFR BLD AUTO: 42.6 % (ref 35–47)
HGB BLD-MCNC: 14.4 G/DL (ref 11.7–15.7)
LYMPHOCYTES # BLD AUTO: 2.6 10E3/UL (ref 0.8–5.3)
LYMPHOCYTES NFR BLD AUTO: 33 %
MCH RBC QN AUTO: 29.9 PG (ref 26.5–33)
MCHC RBC AUTO-ENTMCNC: 33.8 G/DL (ref 31.5–36.5)
MCV RBC AUTO: 89 FL (ref 78–100)
MONOCYTES # BLD AUTO: 0.5 10E3/UL (ref 0–1.3)
MONOCYTES NFR BLD AUTO: 7 %
NEUTROPHILS # BLD AUTO: 4.6 10E3/UL (ref 1.6–8.3)
NEUTROPHILS NFR BLD AUTO: 58 %
PLATELET # BLD AUTO: 250 10E3/UL (ref 150–450)
RBC # BLD AUTO: 4.81 10E6/UL (ref 3.8–5.2)
WBC # BLD AUTO: 7.9 10E3/UL (ref 4–11)

## 2023-03-10 PROCEDURE — 83036 HEMOGLOBIN GLYCOSYLATED A1C: CPT | Performed by: PHYSICIAN ASSISTANT

## 2023-03-10 PROCEDURE — 99214 OFFICE O/P EST MOD 30 MIN: CPT | Performed by: PHYSICIAN ASSISTANT

## 2023-03-10 PROCEDURE — 85025 COMPLETE CBC W/AUTO DIFF WBC: CPT | Performed by: PHYSICIAN ASSISTANT

## 2023-03-10 PROCEDURE — 36415 COLL VENOUS BLD VENIPUNCTURE: CPT | Performed by: PHYSICIAN ASSISTANT

## 2023-03-10 RX ORDER — ONDANSETRON 4 MG/1
4 TABLET, FILM COATED ORAL EVERY 8 HOURS PRN
Qty: 30 TABLET | Refills: 0 | Status: SHIPPED | OUTPATIENT
Start: 2023-03-10 | End: 2023-11-06

## 2023-03-10 RX ORDER — NORGESTIMATE AND ETHINYL ESTRADIOL 0.25-0.035
KIT ORAL
Qty: 30 TABLET | Refills: 0 | Status: SHIPPED | OUTPATIENT
Start: 2023-03-10 | End: 2023-11-06

## 2023-03-10 NOTE — PROGRESS NOTES
"  Assessment & Plan     Irregular periods  Lab normal today.  Use ocp at high dose tapering down to stop period and hopefully reset cycle.  Pt warned of nausea and Zofran given.    - CBC with platelets and differential; Future  - Hemoglobin A1c; Future  - norgestimate-ethinyl estradiol (ORTHO-CYCLEN) 0.25-35 MG-MCG tablet; Take 5 pills on day one, 4 day 2, 3 day 3, 2 day 4 and then finish out pack  - ondansetron (ZOFRAN) 4 MG tablet; Take 1 tablet (4 mg) by mouth every 8 hours as needed for nausea  - CBC with platelets and differential  - Hemoglobin A1c    Dizziness  Unsure of cause since not anemic.  Monitor   - CBC with platelets and differential; Future  - CBC with platelets and differential    Stomach pain more likely related to acid.  Monitor and continued pepto as needed             BMI:   Estimated body mass index is 34.23 kg/m  as calculated from the following:    Height as of 8/30/22: 1.68 m (5' 6.14\").    Weight as of this encounter: 96.6 kg (213 lb).   Weight management plan: did not address        No follow-ups on file.    WALE Funes [facility] BHAVYA Rausch is a 22 year old accompanied by her self , presenting for the following health issues:  Vaginal Bleeding      History of Present Illness       Reason for visit:  Bleeding for a couple months and stomach pain    She eats 2-3 servings of fruits and vegetables daily.She consumes 1 sweetened beverage(s) daily.She exercises with enough effort to increase her heart rate 20 to 29 minutes per day.  She exercises with enough effort to increase her heart rate 3 or less days per week.   She is taking medications regularly.       Menstrual Concern stopped Depo Provera 8/22   Onset/Duration: 3 months   Description:   Duration of bleeding episodes: all the time   Frequency of periods: (1st day of one to 1st day of next):    Describe bleeding/flow:   Clots: YES  Number of pads/day: 3-4         Cramping: " moderate  Accompanying Signs & Symptoms:  Lightheadedness: YES  Temperature intolerance: No  Nosebleeds/Easy bruising: No  Vaginal Discharge: No  Acne: No  Change in body hair: No  History:  No LMP recorded. Patient has had an injection.  Previous normal periods: no   Contraceptive use: NO  Sexually active: YES  Any bleeding after intercourse: No  Abnormal PAP Smears: No  Precipitating or alleviating factors: None  Therapies tried and outcome: None    No period until Dec and now on and off bleeding since.    Did have regular periods before depo.    Negative pregnancy test at home.  Getting more stomach cramps.  Has lower and upper pain.  Some dizziness too.     stomach pain with eating or drinking including water.  Dizziness is like tunnel vision.  No palpitations, fainting and happens randomly.          Review of Systems   As above      Objective    /75   Pulse 81   Temp 98.9  F (37.2  C) (Oral)   Resp 19   Wt 96.6 kg (213 lb)   SpO2 98%   BMI 34.23 kg/m    Body mass index is 34.23 kg/m .  Physical Exam  Constitutional:       General: She is not in acute distress.     Appearance: She is obese.   Neck:      Vascular: No carotid bruit.   Cardiovascular:      Rate and Rhythm: Normal rate and regular rhythm.   Pulmonary:      Effort: Pulmonary effort is normal.      Breath sounds: Normal breath sounds.   Abdominal:      General: Bowel sounds are normal.      Tenderness: There is abdominal tenderness (epigastric).   Lymphadenopathy:      Cervical: No cervical adenopathy.   Neurological:      Mental Status: She is alert.

## 2023-03-16 ENCOUNTER — OFFICE VISIT (OUTPATIENT)
Dept: URGENT CARE | Facility: URGENT CARE | Age: 23
End: 2023-03-16
Payer: COMMERCIAL

## 2023-03-16 VITALS
HEART RATE: 76 BPM | OXYGEN SATURATION: 99 % | WEIGHT: 213.4 LBS | BODY MASS INDEX: 34.3 KG/M2 | TEMPERATURE: 98 F | SYSTOLIC BLOOD PRESSURE: 122 MMHG | DIASTOLIC BLOOD PRESSURE: 77 MMHG | HEIGHT: 66 IN

## 2023-03-16 DIAGNOSIS — B37.31 CANDIDIASIS OF VAGINA: ICD-10-CM

## 2023-03-16 DIAGNOSIS — R30.0 DYSURIA: Primary | ICD-10-CM

## 2023-03-16 DIAGNOSIS — N76.0 BV (BACTERIAL VAGINOSIS): ICD-10-CM

## 2023-03-16 DIAGNOSIS — B96.89 BV (BACTERIAL VAGINOSIS): ICD-10-CM

## 2023-03-16 LAB
ALBUMIN UR-MCNC: 100 MG/DL
APPEARANCE UR: ABNORMAL
BACTERIA #/AREA URNS HPF: ABNORMAL /HPF
BILIRUB UR QL STRIP: NEGATIVE
CLUE CELLS: PRESENT
COLOR UR AUTO: YELLOW
GLUCOSE UR STRIP-MCNC: NEGATIVE MG/DL
HGB UR QL STRIP: ABNORMAL
KETONES UR STRIP-MCNC: NEGATIVE MG/DL
LEUKOCYTE ESTERASE UR QL STRIP: ABNORMAL
NITRATE UR QL: NEGATIVE
PH UR STRIP: 6 [PH] (ref 5–7)
RBC #/AREA URNS AUTO: ABNORMAL /HPF
SP GR UR STRIP: 1.02 (ref 1–1.03)
SQUAMOUS #/AREA URNS AUTO: ABNORMAL /LPF
TRICHOMONAS, WET PREP: ABNORMAL
UROBILINOGEN UR STRIP-ACNC: 2 E.U./DL
WBC #/AREA URNS AUTO: >100 /HPF
WBC CLUMPS #/AREA URNS HPF: PRESENT /HPF
WBC'S/HIGH POWER FIELD, WET PREP: ABNORMAL
YEAST, WET PREP: PRESENT

## 2023-03-16 PROCEDURE — 81001 URINALYSIS AUTO W/SCOPE: CPT | Performed by: PHYSICIAN ASSISTANT

## 2023-03-16 PROCEDURE — 87086 URINE CULTURE/COLONY COUNT: CPT | Performed by: PHYSICIAN ASSISTANT

## 2023-03-16 PROCEDURE — 99214 OFFICE O/P EST MOD 30 MIN: CPT | Performed by: PHYSICIAN ASSISTANT

## 2023-03-16 PROCEDURE — 87210 SMEAR WET MOUNT SALINE/INK: CPT | Performed by: PHYSICIAN ASSISTANT

## 2023-03-16 PROCEDURE — 87186 SC STD MICRODIL/AGAR DIL: CPT | Performed by: PHYSICIAN ASSISTANT

## 2023-03-16 RX ORDER — FLUCONAZOLE 150 MG/1
150 TABLET ORAL
Qty: 3 TABLET | Refills: 0 | Status: SHIPPED | OUTPATIENT
Start: 2023-03-16 | End: 2023-03-23

## 2023-03-16 RX ORDER — NITROFURANTOIN 25; 75 MG/1; MG/1
100 CAPSULE ORAL 2 TIMES DAILY
Qty: 14 CAPSULE | Refills: 0 | Status: SHIPPED | OUTPATIENT
Start: 2023-03-16 | End: 2023-03-23

## 2023-03-16 RX ORDER — METRONIDAZOLE 500 MG/1
500 TABLET ORAL 2 TIMES DAILY
Qty: 14 TABLET | Refills: 0 | Status: SHIPPED | OUTPATIENT
Start: 2023-03-16 | End: 2023-03-23

## 2023-03-16 ASSESSMENT — ENCOUNTER SYMPTOMS
CHEST TIGHTNESS: 0
HEMATOCHEZIA: 0
WHEEZING: 0
ABDOMINAL PAIN: 0
FLANK PAIN: 0
FATIGUE: 0
PALPITATIONS: 0
VOMITING: 0
CONSTIPATION: 0
FREQUENCY: 1
NAUSEA: 0
CARDIOVASCULAR NEGATIVE: 1
FEVER: 0
GASTROINTESTINAL NEGATIVE: 1
RESPIRATORY NEGATIVE: 1
SHORTNESS OF BREATH: 0
CHILLS: 0
DIARRHEA: 0
DYSURIA: 1
HEMATURIA: 1
COUGH: 0
HEARTBURN: 0

## 2023-03-16 NOTE — PROGRESS NOTES
Karina Rausch is a 22 year old, presenting for the following health issues:  Frequency, Urinary Pain, and Abdominal Pain    HPI   Genitourinary - Female  Onset/Duration: 2days  Description:   Painful urination (Dysuria): YES           Frequency: YES  Blood in urine (Hematuria): YES  Delay in urine (Hesitency): YES  Intensity: moderate  Progression of Symptoms:  same  Accompanying Signs & Symptoms:  Fever/chills: No  Flank pain: No  Nausea and vomiting: No  Vaginal symptoms: none  Abdominal/Pelvic Pain: YES  History:   History of frequent UTI s: No  History of kidney stones: No  Sexually Active: No  Possibility of pregnancy: No  Precipitating or alleviating factors: None  Therapies tried and outcome: Increase fluid intake with minimal relief     Patient Active Problem List   Diagnosis     Health Care Home     Acanthosis nigricans     Chronic tonsillitis     Class 1 obesity due to excess calories with body mass index (BMI) of 31.0 to 31.9 in adult, unspecified whether serious comorbidity present     Family history of diabetes mellitus     Current Outpatient Medications   Medication     norgestimate-ethinyl estradiol (ORTHO-CYCLEN) 0.25-35 MG-MCG tablet     ondansetron (ZOFRAN) 4 MG tablet     No current facility-administered medications for this visit.        Allergies   Allergen Reactions     Cats      Eyes water,      Seasonal Allergies        Review of Systems   Constitutional: Negative for chills, fatigue and fever.   Respiratory: Negative.  Negative for cough, chest tightness, shortness of breath and wheezing.    Cardiovascular: Negative.  Negative for chest pain, palpitations and peripheral edema.   Gastrointestinal: Negative.  Negative for abdominal pain, constipation, diarrhea, heartburn, hematochezia, nausea and vomiting.   Genitourinary: Positive for dysuria, frequency, hematuria, menstrual problem and urgency. Negative for flank pain, pelvic pain, vaginal bleeding and vaginal discharge.   All other  "systems reviewed and are negative.           Objective    /77   Pulse 76   Temp 98  F (36.7  C) (Tympanic)   Ht 1.676 m (5' 6\")   Wt 96.8 kg (213 lb 6.4 oz)   SpO2 99%   BMI 34.44 kg/m    Body mass index is 34.44 kg/m .  Physical Exam  Vitals and nursing note reviewed.   Constitutional:       General: She is not in acute distress.     Appearance: Normal appearance. She is well-developed and normal weight. She is not ill-appearing.   Cardiovascular:      Rate and Rhythm: Normal rate and regular rhythm.      Pulses: Normal pulses.      Heart sounds: Normal heart sounds, S1 normal and S2 normal. No murmur heard.    No friction rub. No gallop.   Pulmonary:      Effort: Pulmonary effort is normal. No accessory muscle usage or respiratory distress.      Breath sounds: Normal breath sounds and air entry. No decreased breath sounds, wheezing, rhonchi or rales.   Abdominal:      General: Abdomen is flat. Bowel sounds are normal.      Palpations: Abdomen is soft. There is no hepatomegaly, splenomegaly or mass.      Tenderness: There is abdominal tenderness in the suprapubic area. There is no right CVA tenderness, left CVA tenderness, guarding or rebound. Negative signs include Stapleton's sign, Rovsing's sign, McBurney's sign, psoas sign and obturator sign.      Hernia: No hernia is present.   Genitourinary:     Comments: Declined pelvic exam.  Skin:     General: Skin is warm and dry.   Neurological:      Mental Status: She is alert and oriented to person, place, and time.   Psychiatric:         Mood and Affect: Mood normal.         Behavior: Behavior normal.         Thought Content: Thought content normal.         Judgment: Judgment normal.       Results for orders placed or performed in visit on 03/16/23 (from the past 24 hour(s))   UA Macro with Reflex to Micro and Culture - lab collect    Specimen: Urine, Midstream   Result Value Ref Range    Color Urine Yellow Colorless, Straw, Light Yellow, Yellow    Appearance " Urine Cloudy (A) Clear    Glucose Urine Negative Negative mg/dL    Bilirubin Urine Negative Negative    Ketones Urine Negative Negative mg/dL    Specific Gravity Urine 1.025 1.003 - 1.035    Blood Urine Large (A) Negative    pH Urine 6.0 5.0 - 7.0    Protein Albumin Urine 100 (A) Negative mg/dL    Urobilinogen Urine 2.0 (A) 0.2, 1.0 E.U./dL    Nitrite Urine Negative Negative    Leukocyte Esterase Urine Moderate (A) Negative   Wet prep - lab collect    Specimen: Vagina; Swab   Result Value Ref Range    Trichomonas Absent Absent    Yeast Present (A) Absent    Clue Cells Present (A) Absent    WBCs/high power field 3+ (A) None   Urine Microscopic Exam   Result Value Ref Range    Bacteria Urine Many (A) None Seen /HPF    RBC Urine 25-50 (A) 0-2 /HPF /HPF    WBC Urine >100 (A) 0-5 /HPF /HPF    Squamous Epithelials Urine Few (A) None Seen /LPF    WBC Clumps Urine Present (A) None Seen /HPF         Assessment/Plan:  Dysuria:  UA and micro is suspicious for UTI and will empirically treat with [id number].  Will send for UC to help guide abx treatment.  Recommend increase fluids, regular voids, proper wiping techniques and voiding after intercourse.  Educated patient on warning signs of kidney infection and to go to the ER if she develops any of these symptoms.  Recheck in clinic if symptoms worsen or if symptoms do not improve.  -     UA Macro with Reflex to Micro and Culture - lab collect; Future  -     UA Macro with Reflex to Micro and Culture - lab collect  -     Urine Microscopic Exam  -     Urine Culture  -     nitroFURantoin macrocrystal-monohydrate (MACROBID) 100 MG capsule; Take 1 capsule (100 mg) by mouth 2 times daily for 7 days    BV (bacterial vaginosis):  Wet prep showed clue cells present.  Will treat with metronidazole X1week.  No ETOH while on medications due to disulfuram reaction.  Avoid foreign body insertion, scented personal care products and frequent baths.   -     Wet prep - lab collect; Future  -      Wet prep - lab collect  -     metroNIDAZOLE (FLAGYL) 500 MG tablet; Take 1 tablet (500 mg) by mouth 2 times daily for 7 days    Candidiasis of vagina:  Will give diflucan as directed.  -     fluconazole (DIFLUCAN) 150 MG tablet; Take 1 tablet (150 mg) by mouth every 3 days for 3 doses        Darlene Medrano PA-C

## 2023-03-17 LAB — BACTERIA UR CULT: ABNORMAL

## 2023-07-20 ENCOUNTER — OFFICE VISIT (OUTPATIENT)
Dept: URGENT CARE | Facility: URGENT CARE | Age: 23
End: 2023-07-20
Payer: COMMERCIAL

## 2023-07-20 VITALS
HEART RATE: 75 BPM | OXYGEN SATURATION: 99 % | BODY MASS INDEX: 32.72 KG/M2 | TEMPERATURE: 97.8 F | RESPIRATION RATE: 16 BRPM | WEIGHT: 202.7 LBS | SYSTOLIC BLOOD PRESSURE: 118 MMHG | DIASTOLIC BLOOD PRESSURE: 77 MMHG

## 2023-07-20 DIAGNOSIS — N76.0 BV (BACTERIAL VAGINOSIS): Primary | ICD-10-CM

## 2023-07-20 DIAGNOSIS — B96.89 BV (BACTERIAL VAGINOSIS): Primary | ICD-10-CM

## 2023-07-20 LAB
ALBUMIN UR-MCNC: NEGATIVE MG/DL
APPEARANCE UR: CLEAR
BILIRUB UR QL STRIP: NEGATIVE
CLUE CELLS: PRESENT
COLOR UR AUTO: YELLOW
GLUCOSE UR STRIP-MCNC: NEGATIVE MG/DL
HGB UR QL STRIP: NEGATIVE
KETONES UR STRIP-MCNC: NEGATIVE MG/DL
LEUKOCYTE ESTERASE UR QL STRIP: NEGATIVE
NITRATE UR QL: NEGATIVE
PH UR STRIP: 6 [PH] (ref 5–7)
SP GR UR STRIP: <=1.005 (ref 1–1.03)
TRICHOMONAS, WET PREP: ABNORMAL
UROBILINOGEN UR STRIP-ACNC: 0.2 E.U./DL
WBC'S/HIGH POWER FIELD, WET PREP: ABNORMAL
YEAST, WET PREP: ABNORMAL

## 2023-07-20 PROCEDURE — 81003 URINALYSIS AUTO W/O SCOPE: CPT | Performed by: PHYSICIAN ASSISTANT

## 2023-07-20 PROCEDURE — 99213 OFFICE O/P EST LOW 20 MIN: CPT | Performed by: PHYSICIAN ASSISTANT

## 2023-07-20 PROCEDURE — 87210 SMEAR WET MOUNT SALINE/INK: CPT | Performed by: PHYSICIAN ASSISTANT

## 2023-07-20 RX ORDER — METRONIDAZOLE 500 MG/1
500 TABLET ORAL 2 TIMES DAILY
Qty: 14 TABLET | Refills: 0 | Status: SHIPPED | OUTPATIENT
Start: 2023-07-20 | End: 2023-07-27

## 2023-07-20 ASSESSMENT — ENCOUNTER SYMPTOMS
DYSURIA: 1
SHORTNESS OF BREATH: 0
FATIGUE: 0
DIAPHORESIS: 0
CHEST TIGHTNESS: 0
CHILLS: 0
FEVER: 0
FREQUENCY: 1
PALPITATIONS: 0
COLOR CHANGE: 0
FLANK PAIN: 0
WOUND: 0

## 2023-07-20 NOTE — PROGRESS NOTES
Subjective   Miracle is a 22 year old, presenting for the following health issues:  UTI (Burning sensation with urination. Onset- Two days )  HPI   Genitourinary - Female  Onset/Duration: 3 days ago  Description:   Painful urination (Dysuria): YES           Frequency: YES  Blood in urine (Hematuria): No  Delay in urine (Hesitency): YES  Intensity: moderate  Progression of Symptoms:  worsening  Accompanying Signs & Symptoms:  Fever/chills: No  Flank pain: No  Nausea and vomiting: No  Vaginal symptoms: none  Abdominal/Pelvic Pain: YES- mild suprapubic pain  History:   History of frequent UTI s: No  History of kidney stones: No  Sexually Active: YES  Possibility of pregnancy: No - finished period within the last week  Precipitating or alleviating factors: None  Therapies tried and outcome:  rest,fluids with minimal relief    Patient Active Problem List   Diagnosis     Health Care Home     Acanthosis nigricans     Chronic tonsillitis     Class 1 obesity due to excess calories with body mass index (BMI) of 31.0 to 31.9 in adult, unspecified whether serious comorbidity present     Family history of diabetes mellitus     Current Outpatient Medications   Medication     norgestimate-ethinyl estradiol (ORTHO-CYCLEN) 0.25-35 MG-MCG tablet     ondansetron (ZOFRAN) 4 MG tablet     No current facility-administered medications for this visit.      Allergies   Allergen Reactions     Cats      Eyes water,      Seasonal Allergies        Review of Systems   Constitutional: Negative for chills, diaphoresis, fatigue and fever.   Respiratory: Negative for chest tightness and shortness of breath.    Cardiovascular: Negative for chest pain and palpitations.   Genitourinary: Positive for dysuria, frequency and urgency. Negative for decreased urine volume, flank pain, genital sores, pelvic pain and vaginal discharge.   Skin: Negative for color change, pallor, rash and wound.   All other systems reviewed and are negative.       Objective     /77 (BP Location: Left arm, Patient Position: Sitting, Cuff Size: Adult Regular)   Pulse 75   Temp 97.8  F (36.6  C) (Tympanic)   Resp 16   Wt 91.9 kg (202 lb 11.2 oz)   SpO2 99%   BMI 32.72 kg/m    Body mass index is 32.72 kg/m .  Physical Exam  Vitals and nursing note reviewed.   Constitutional:       General: She is not in acute distress.     Appearance: Normal appearance. She is normal weight. She is not ill-appearing, toxic-appearing or diaphoretic.   HENT:      Head: Normocephalic and atraumatic.   Cardiovascular:      Rate and Rhythm: Normal rate and regular rhythm.      Pulses: Normal pulses.      Heart sounds: Normal heart sounds, S1 normal and S2 normal.   Pulmonary:      Effort: Pulmonary effort is normal.      Breath sounds: Normal breath sounds and air entry. No wheezing or rales.   Abdominal:      General: Abdomen is flat. Bowel sounds are normal. There is no distension.      Palpations: Abdomen is soft. There is no mass.      Tenderness: There is no abdominal tenderness. There is no right CVA tenderness, left CVA tenderness, guarding or rebound. Negative signs include Stapleton's sign, Rovsing's sign and McBurney's sign.   Neurological:      General: No focal deficit present.      Mental Status: She is alert and oriented to person, place, and time.   Psychiatric:         Mood and Affect: Mood normal.         Behavior: Behavior normal.         Thought Content: Thought content normal.         Judgment: Judgment normal.       Results for orders placed or performed in visit on 07/20/23 (from the past 24 hour(s))   UA Macroscopic with reflex to Microscopic and Culture - Lab Collect    Specimen: Urine, Clean Catch   Result Value Ref Range    Color Urine Yellow Colorless, Straw, Light Yellow, Yellow    Appearance Urine Clear Clear    Glucose Urine Negative Negative mg/dL    Bilirubin Urine Negative Negative    Ketones Urine Negative Negative mg/dL    Specific Gravity Urine <=1.005 1.003 - 1.035     Blood Urine Negative Negative    pH Urine 6.0 5.0 - 7.0    Protein Albumin Urine Negative Negative mg/dL    Urobilinogen Urine 0.2 0.2, 1.0 E.U./dL    Nitrite Urine Negative Negative    Leukocyte Esterase Urine Negative Negative    Narrative    Microscopic not indicated   Wet prep - lab collect    Specimen: Vagina; Swab   Result Value Ref Range    Trichomonas Absent Absent    Yeast Absent Absent    Clue Cells Present (A) Absent    WBCs/high power field 2+ (A) None         Assessment & Plan:  BV (bacterial vaginosis):  Patient in no acute distress. Vitals stable. No fever or CVA tenderness. Discussed UA without signs of infection and wet prep showing BV with patient. Rx for Flagyl sent in. Discussed avoiding alcohol while taking this and possible side effects. Follow up with PCP if not improving. Discussed red flag symptoms warranting emergent medical attention. Patient voiced understanding and agreement with the plan as above.   -     metroNIDAZOLE (FLAGYL) 500 MG tablet; Take 1 tablet (500 mg) by mouth 2 times daily for 7 days  -     UA Macroscopic with reflex to Microscopic and Culture - Lab Collect; Future  -     Wet prep - lab collect; Future  -     UA Macroscopic with reflex to Microscopic and Culture - Lab Collect  -     Wet prep - lab collect        Physician Attestation   I, Darlene Medrano PA-C, was present with the medical/DAVID student, WINSTON Millan, who participated in the service and in the documentation of the note.  I have verified the history and personally performed the physical exam and medical decision making.  I agree with the assessment and plan of care as documented in the note.        Darlene Medrano PA-C

## 2023-09-10 ENCOUNTER — OFFICE VISIT (OUTPATIENT)
Dept: URGENT CARE | Facility: URGENT CARE | Age: 23
End: 2023-09-10
Payer: COMMERCIAL

## 2023-09-10 VITALS
DIASTOLIC BLOOD PRESSURE: 81 MMHG | HEART RATE: 78 BPM | BODY MASS INDEX: 33.09 KG/M2 | WEIGHT: 205 LBS | OXYGEN SATURATION: 99 % | TEMPERATURE: 97.7 F | RESPIRATION RATE: 13 BRPM | SYSTOLIC BLOOD PRESSURE: 113 MMHG

## 2023-09-10 DIAGNOSIS — N89.8 VAGINAL DISCHARGE: Primary | ICD-10-CM

## 2023-09-10 DIAGNOSIS — N76.0 ACUTE VAGINITIS: ICD-10-CM

## 2023-09-10 LAB
CLUE CELLS: ABNORMAL
TRICHOMONAS, WET PREP: ABNORMAL
WBC'S/HIGH POWER FIELD, WET PREP: ABNORMAL
YEAST, WET PREP: ABNORMAL

## 2023-09-10 PROCEDURE — 99213 OFFICE O/P EST LOW 20 MIN: CPT | Performed by: INTERNAL MEDICINE

## 2023-09-10 PROCEDURE — 87210 SMEAR WET MOUNT SALINE/INK: CPT | Performed by: INTERNAL MEDICINE

## 2023-09-10 NOTE — PATIENT INSTRUCTIONS
Non-scented soaps to vaginal area  Continue to coat area with Vaseline after urination    Area irritated after intercourse & more sensitive to scented soaps  Cotton underwear      Wet prep normal   No bacterial vaginosis     For bacterial vaginosis prevention, could try boric acid vaginal suppositories.  Try weekly.

## 2023-09-10 NOTE — PROGRESS NOTES
ASSESSMENT AND PLAN:      ICD-10-CM    1. Vaginal discharge  N89.8 Wet prep - Clinic Collect      2. Acute vaginitis  N76.0         Patient Instructions   Non-scented soaps to vaginal area  Continue to coat area with Vaseline after urination    Area irritated after intercourse & more sensitive to scented soaps  Cotton underwear      Wet prep normal   No bacterial vaginosis     For bacterial vaginosis prevention, could try boric acid vaginal suppositories.  Try weekly.  Return if symptoms worsen or fail to improve.        Moon Cronin MD  [facility]    Subjective     Miraclemana Coburn is a 22 year old who presents for Patient presents with:  Vaginal Discharge    an established patient of [facility].    GYN Complaint    Onset of symptoms was 3 day(s) ago.  Course of illness is improving.      Current and Associated symptoms: local irritation, vulvar itching, and stings with soap  Last sexually active 3-4 days ago  Treatment measures tried include:  vaseline  Hx of previous symptom: similar but not the same as bacterial vaginosis   \    Monogamous.  No concerns for STDs  Would like to avoid frequent bacterial vaginosis.  She has been getting this in the past year    Review of Systems        Objective    /81   Pulse 78   Temp 97.7  F (36.5  C)   Resp 13   Wt 93 kg (205 lb)   SpO2 99%   BMI 33.09 kg/m    Physical Exam  Vitals reviewed.   Constitutional:       Appearance: Normal appearance. She is not ill-appearing.   Neurological:      Mental Status: She is alert.            Results for orders placed or performed in visit on 09/10/23 (from the past 24 hour(s))   Wet prep - Clinic Collect    Specimen: Vagina; Swab   Result Value Ref Range    Trichomonas Absent Absent    Yeast Absent Absent    Clue Cells Absent Absent    WBCs/high power field 1+ (A) None

## 2023-11-06 ENCOUNTER — OFFICE VISIT (OUTPATIENT)
Dept: FAMILY MEDICINE | Facility: CLINIC | Age: 23
End: 2023-11-06
Payer: COMMERCIAL

## 2023-11-06 VITALS
OXYGEN SATURATION: 100 % | HEIGHT: 66 IN | HEART RATE: 81 BPM | DIASTOLIC BLOOD PRESSURE: 73 MMHG | SYSTOLIC BLOOD PRESSURE: 117 MMHG | WEIGHT: 206.8 LBS | TEMPERATURE: 98.4 F | BODY MASS INDEX: 33.23 KG/M2

## 2023-11-06 DIAGNOSIS — Z23 HIGH PRIORITY FOR 2019-NCOV VACCINE: ICD-10-CM

## 2023-11-06 DIAGNOSIS — Z00.00 ROUTINE GENERAL MEDICAL EXAMINATION AT A HEALTH CARE FACILITY: Primary | ICD-10-CM

## 2023-11-06 DIAGNOSIS — Z12.4 CERVICAL CANCER SCREENING: ICD-10-CM

## 2023-11-06 DIAGNOSIS — Z11.3 SCREENING FOR STDS (SEXUALLY TRANSMITTED DISEASES): ICD-10-CM

## 2023-11-06 DIAGNOSIS — Z23 NEED FOR PROPHYLACTIC VACCINATION AND INOCULATION AGAINST INFLUENZA: ICD-10-CM

## 2023-11-06 DIAGNOSIS — N92.6 MISSED PERIOD: ICD-10-CM

## 2023-11-06 DIAGNOSIS — R53.83 OTHER FATIGUE: ICD-10-CM

## 2023-11-06 LAB
BASOPHILS # BLD AUTO: 0 10E3/UL (ref 0–0.2)
BASOPHILS NFR BLD AUTO: 0 %
EOSINOPHIL # BLD AUTO: 0.1 10E3/UL (ref 0–0.7)
EOSINOPHIL NFR BLD AUTO: 1 %
ERYTHROCYTE [DISTWIDTH] IN BLOOD BY AUTOMATED COUNT: 12.4 % (ref 10–15)
HCT VFR BLD AUTO: 40.4 % (ref 35–47)
HGB BLD-MCNC: 13.4 G/DL (ref 11.7–15.7)
IMM GRANULOCYTES # BLD: 0 10E3/UL
IMM GRANULOCYTES NFR BLD: 0 %
LYMPHOCYTES # BLD AUTO: 2.9 10E3/UL (ref 0.8–5.3)
LYMPHOCYTES NFR BLD AUTO: 27 %
MCH RBC QN AUTO: 29.8 PG (ref 26.5–33)
MCHC RBC AUTO-ENTMCNC: 33.2 G/DL (ref 31.5–36.5)
MCV RBC AUTO: 90 FL (ref 78–100)
MONOCYTES # BLD AUTO: 0.7 10E3/UL (ref 0–1.3)
MONOCYTES NFR BLD AUTO: 7 %
NEUTROPHILS # BLD AUTO: 6.8 10E3/UL (ref 1.6–8.3)
NEUTROPHILS NFR BLD AUTO: 65 %
PLATELET # BLD AUTO: 232 10E3/UL (ref 150–450)
RBC # BLD AUTO: 4.49 10E6/UL (ref 3.8–5.2)
WBC # BLD AUTO: 10.5 10E3/UL (ref 4–11)

## 2023-11-06 PROCEDURE — 87591 N.GONORRHOEAE DNA AMP PROB: CPT | Performed by: PHYSICIAN ASSISTANT

## 2023-11-06 PROCEDURE — 85025 COMPLETE CBC W/AUTO DIFF WBC: CPT | Performed by: PHYSICIAN ASSISTANT

## 2023-11-06 PROCEDURE — 82306 VITAMIN D 25 HYDROXY: CPT | Performed by: PHYSICIAN ASSISTANT

## 2023-11-06 PROCEDURE — 84702 CHORIONIC GONADOTROPIN TEST: CPT | Performed by: PHYSICIAN ASSISTANT

## 2023-11-06 PROCEDURE — 99213 OFFICE O/P EST LOW 20 MIN: CPT | Mod: 25 | Performed by: PHYSICIAN ASSISTANT

## 2023-11-06 PROCEDURE — G0145 SCR C/V CYTO,THINLAYER,RESCR: HCPCS | Performed by: PHYSICIAN ASSISTANT

## 2023-11-06 PROCEDURE — 90471 IMMUNIZATION ADMIN: CPT | Performed by: PHYSICIAN ASSISTANT

## 2023-11-06 PROCEDURE — 99395 PREV VISIT EST AGE 18-39: CPT | Mod: 25 | Performed by: PHYSICIAN ASSISTANT

## 2023-11-06 PROCEDURE — 36415 COLL VENOUS BLD VENIPUNCTURE: CPT | Performed by: PHYSICIAN ASSISTANT

## 2023-11-06 PROCEDURE — 90686 IIV4 VACC NO PRSV 0.5 ML IM: CPT | Performed by: PHYSICIAN ASSISTANT

## 2023-11-06 PROCEDURE — 80048 BASIC METABOLIC PNL TOTAL CA: CPT | Performed by: PHYSICIAN ASSISTANT

## 2023-11-06 PROCEDURE — 84443 ASSAY THYROID STIM HORMONE: CPT | Performed by: PHYSICIAN ASSISTANT

## 2023-11-06 PROCEDURE — 90480 ADMN SARSCOV2 VAC 1/ONLY CMP: CPT | Performed by: PHYSICIAN ASSISTANT

## 2023-11-06 PROCEDURE — 87491 CHLMYD TRACH DNA AMP PROBE: CPT | Performed by: PHYSICIAN ASSISTANT

## 2023-11-06 PROCEDURE — 91320 SARSCV2 VAC 30MCG TRS-SUC IM: CPT | Performed by: PHYSICIAN ASSISTANT

## 2023-11-06 PROCEDURE — 84439 ASSAY OF FREE THYROXINE: CPT | Performed by: PHYSICIAN ASSISTANT

## 2023-11-06 ASSESSMENT — ENCOUNTER SYMPTOMS
PALPITATIONS: 0
COUGH: 1
NAUSEA: 1
MYALGIAS: 0
FEVER: 0
HEMATOCHEZIA: 0
ARTHRALGIAS: 0
CHILLS: 0
HEADACHES: 0
SHORTNESS OF BREATH: 0
JOINT SWELLING: 0
EYE PAIN: 0
WEAKNESS: 1
FREQUENCY: 0
NERVOUS/ANXIOUS: 0
PARESTHESIAS: 1
SORE THROAT: 1
CONSTIPATION: 1
DIZZINESS: 1
ABDOMINAL PAIN: 1
HEMATURIA: 0
DYSURIA: 0
HEARTBURN: 0
DIARRHEA: 0

## 2023-11-06 NOTE — PROGRESS NOTES
SUBJECTIVE:   CC: Miracle is an 22 year old who presents for preventive health visit.       11/6/2023     4:01 PM   Additional Questions   Roomed by An V.         11/6/2023     4:01 PM   Patient Reported Additional Medications   Patient reports taking the following new medications None       Healthy Habits:     Getting at least 3 servings of Calcium per day:  NO    Bi-annual eye exam:  Yes    Dental care twice a year:  NO    Sleep apnea or symptoms of sleep apnea:  Daytime drowsiness    Diet:  Regular (no restrictions) and Breakfast skipped    Frequency of exercise:  2-3 days/week    Duration of exercise:  30-45 minutes    Taking medications regularly:  Yes    Barriers to taking medications:  None    Medication side effects:  Not applicable    Additional concerns today:  Yes      No period for 2 months. Did take a pregnancy test - was negative.     Has been having issues with fatigue. When driving - has needed to pull over. Has happened 3 times.     Sleeping about 7-8 hours.    In school part time - also working at [employer].       -------------------------------------      Social History     Tobacco Use    Smoking status: Never    Smokeless tobacco: Never   Substance Use Topics    Alcohol use: No             11/6/2023     3:49 PM   Alcohol Use   Prescreen: >3 drinks/day or >7 drinks/week? No     Reviewed orders with patient.  Reviewed health maintenance and updated orders accordingly - Yes  BP Readings from Last 3 Encounters:   11/06/23 117/73   09/10/23 113/81   07/20/23 118/77    Wt Readings from Last 3 Encounters:   11/06/23 93.8 kg (206 lb 12.8 oz)   09/10/23 93 kg (205 lb)   07/20/23 91.9 kg (202 lb 11.2 oz)                    Breast Cancer Screening:        History of abnormal Pap smear: NO - age 21-29 PAP every 3 years recommended      3/31/2021     7:52 AM   PAP / HPV   PAP (Historical) NIL      Reviewed and updated as needed this visit by clinical staff   Tobacco  Allergies  Meds           "    Reviewed and updated as needed this visit by Provider                     Review of Systems   Constitutional:  Negative for chills and fever.   HENT:  Positive for congestion and sore throat. Negative for ear pain and hearing loss.    Eyes:  Negative for pain and visual disturbance.   Respiratory:  Positive for cough. Negative for shortness of breath.    Cardiovascular:  Negative for chest pain, palpitations and peripheral edema.   Gastrointestinal:  Positive for abdominal pain, constipation and nausea. Negative for diarrhea, heartburn and hematochezia.   Genitourinary:  Negative for dysuria, frequency, genital sores, hematuria and urgency.   Musculoskeletal:  Negative for arthralgias, joint swelling and myalgias.   Skin:  Negative for rash.   Neurological:  Positive for dizziness, weakness and paresthesias. Negative for headaches.   Psychiatric/Behavioral:  Positive for mood changes. The patient is not nervous/anxious.           OBJECTIVE:   /73   Pulse 81   Temp 98.4  F (36.9  C) (Oral)   Ht 1.682 m (5' 6.22\")   Wt 93.8 kg (206 lb 12.8 oz)   LMP 09/20/2023 (Exact Date)   SpO2 100%   BMI 33.16 kg/m    Physical Exam  GENERAL: healthy, alert and no distress  EYES: Eyes grossly normal to inspection, PERRL and conjunctivae and sclerae normal  HENT: ear canals and TM's normal, nose and mouth without ulcers or lesions  NECK: no adenopathy, no asymmetry, masses, or scars and thyroid normal to palpation  RESP: lungs clear to auscultation - no rales, rhonchi or wheezes  CV: regular rate and rhythm, normal S1 S2, no S3 or S4, no murmur, click or rub, no peripheral edema and peripheral pulses strong  ABDOMEN: soft, nontender, no hepatosplenomegaly, no masses and bowel sounds normal   (female): normal female external genitalia, normal urethral meatus, vaginal mucosa pink, moist, well rugated, and normal cervix/adnexa/uterus without masses or discharge  MS: no gross musculoskeletal defects noted, no " "edema  SKIN: no suspicious lesions or rashes  NEURO: Normal strength and tone, mentation intact and speech normal  PSYCH: mentation appears normal, affect normal/bright        ASSESSMENT/PLAN:   1. Routine general medical examination at a health care facility  Well adult.    2. Screening for STDs (sexually transmitted diseases)  Screen.  - NEISSERIA GONORRHOEA PCR; Future  - CHLAMYDIA TRACHOMATIS PCR  - NEISSERIA GONORRHOEA PCR    3. Cervical cancer screening  Screen.  - Pap Screen only - recommended age 21 - 24 years    4. Missed period  Discussed. Rule out pregnancy. If negative, monitor for another month or so. Could consider transvaginal US.   - hCG Quantitative Pregnancy; Future  - hCG Quantitative Pregnancy    5. Other fatigue  Eval for causes of fatigue.  - TSH; Future  - T4, free; Future  - CBC with platelets and differential; Future  - Basic metabolic panel  (Ca, Cl, CO2, Creat, Gluc, K, Na, BUN); Future  - Vitamin D Deficiency; Future  - TSH  - T4, free  - CBC with platelets and differential  - Basic metabolic panel  (Ca, Cl, CO2, Creat, Gluc, K, Na, BUN)  - Vitamin D Deficiency    6. Need for prophylactic vaccination and inoculation against influenza  7. High priority for 2019-nCoV vaccine  Vaccinated.           COUNSELING:  Reviewed preventive health counseling, as reflected in patient instructions      BMI:   Estimated body mass index is 33.16 kg/m  as calculated from the following:    Height as of this encounter: 1.682 m (5' 6.22\").    Weight as of this encounter: 93.8 kg (206 lb 12.8 oz).   Weight management plan: Discussed healthy diet and exercise guidelines      She reports that she has never smoked. She has never used smokeless tobacco.          [facility]  "

## 2023-11-06 NOTE — COMMUNITY RESOURCES LIST (ENGLISH)
11/06/2023   [facility]  N/A  For questions about this resource list or additional care needs, please contact your primary care clinic or care manager.  Phone: [phone]   Email: N/A   Address: [facility] [place] [address]   Hours: N/A        Hotlines and Helplines       Hotline - Housing crisis  1  [facility] Distance: 8.7 miles      Phone/Virtual   [address] [place] [address]  Language: English  Hours: Mon - Sun Open 24 Hours  Fees: Free   Phone: (133) 481-2581 Email: info@saintolaf.org Website: http://www.saintolaf.org/     2  [facility] Distance: 9.11 miles      Phone/Virtual   [address] [place] [address]  Language: English  Hours: Mon - Sun Open 24 Hours   Phone: (111) 182-2936 Email: info@[employer] Website: http://www.[employer].org          Housing       Coordinated Entry access point  3  [facility] Distance: 7.31 miles      Phone/Virtual   [address] [place] [address]  Language: English  Hours: Mon - Fri 8:30 AM - 12:00 PM , Mon - Fri 1:00 PM - 4:00 PM  Fees: Free   Phone: (781) 615-4622 Ext.2 Email: fredi@[facility].Dr. TATTOFF.org Website: https://www.Dr. TATTOFFusa.org/usn/     4  [facility] ([facility]) Distance: 8.01 miles      In-Person, Phone/Virtual   [address] [place] [address]  Language: English, Irish  Hours: Mon - Fri 10:00 AM - 5:30 PM , Mon - Sun 7:30 PM - 10:20 PM , Sat - Sun 1:00 PM - 5:30 PM  Fees: Free   Phone: (481) 497-1326 Email: info@[employer] Website: https://www.[employer].org/our-programs/adult-shelter-connect-lang-shelter/     Drop-in center or day shelter  5  [facility] Distance: 7.85 miles      In-Person   [address] [place] [address]  Language: English, Hmong, Nauruan, Irish  Hours: Mon - u 8:30 AM - 4:30 PM , Sat - Sun 9:00 AM - 12:00 PM  Fees:  Free   Phone: (635) 209-7404 Email: info@[employer].org Website: https://Visible World.org/     6  [facility] - Youth Drop-in Center Distance: 8.27 miles      In-Person   [address] [place] [address]  Language: English, Kazakh  Hours: Mon - Sun Open 24 Hours  Fees: Free   Phone: (223) 220-5354 Email: youthlink@youthlinkmn.org Website: http://www.youthlinkmn.org     Housing search assistance  7  [facility] ([facility]) Distance: 1.09 miles      In-Person   [address] [facility] [place] [address]  Language: English  Hours: Mon - Fri 8:00 AM - 4:00 PM  Fees: Free   Phone: (363) 199-4993 Email: info@[facility].[employer] Website: https://Krowder.Signix/     8  Neighborhood Assistance [employer]) Distance: 3.17 miles      Phone/Virtual   [address] [place] [address]  Language: English, Kazakh  Hours: Mon - Fri 9:00 AM - 5:00 PM  Fees: Free   Phone: (755) 500-3975 Email: services@WITOI Website: https://www.WITOI     Shelter for families  9  [facility] Distance: 19.11 miles      In-Person   [address] [place] [address]  Language: English  Hours: Mon - Fri 3:00 PM - 9:00 AM , Sat - Sun Open 24 Hours  Fees: Free   Phone: (984) 362-1598 Ext.1 Website: https://www.saintandrews.org/2020/07/03/emergency-family-shelter/     Shelter for individuals  10  [facility] Distance: 8.23 miles      In-Person   [address] [place] [address]  Language: English  Hours: Mon - Fri 4:00 PM - 9:00 AM  Fees: Free   Phone: (992) 306-6393 Email: manolo@[facility].org Website: https://[facility].org/northern/[facility]/     11  [facility] Distance: 9.73 miles      In-Person   [address] [place] [address]  Language: English  Hours: Mon - Sun Open 24 Hours  Fees: Free    Phone: (831) 847-4730 Email: communications@\[facility]\""Chip Estimatemn.org Website: https://\[facility]\""Chip Estimatemn.org/oursaviourshousing/     Shelter for youth  12  [facility] - Emergency Youth Shelter Distance: 12.69 miles      In-Person   [address] [place] [address]  Language: English  Hours: Mon - Sun Open 24 Hours  Fees: Free   Phone: (698) 845-2901 Email: info@[employer] Website: https://www.MoSo.org/locations/hope-[place]/          Important Numbers & Websites       Emergency Services   911  [facility] Services   311  Poison Control   (597) 682-3336  Suicide Prevention Lifeline   (160) 612-4380 (TALK)  Child Abuse Hotline   (670) 654-3108 (4-A-Child)  Sexual Assault Hotline   (637) 190-9640 (HOPE)  National Runaway Safeline   (968) 589-4264 (RUNAWAY)  All-Options Talkline   (829) 191-8952  Substance Abuse Referral   (496) 316-1955 (HELP)

## 2023-11-06 NOTE — COMMUNITY RESOURCES LIST (ENGLISH)
11/06/2023   [facility]  N/A  For additional resource needs, please contact your health insurance member services or your primary care team.  Phone: [phone]   Email: N/A   Address: [facility] [place] [address]   Hours: N/A        Hotlines and Helplines       Hotline - Housing crisis  1  [facility] Distance: 8.7 miles      Phone/Virtual   [address] [place] [address]  Language: English  Hours: Mon - Sun Open 24 Hours  Fees: Free   Phone: (251) 899-3591 Email: info@saintolaf.org Website: http://www.saintolaf.org/     2  [facility] Distance: 9.11 miles      Phone/Virtual   [address] [place] [address]  Language: English  Hours: Mon - Sun Open 24 Hours   Phone: (872) 522-8570 Email: info@[employer].com Website: http://www.CollabNet.org          Housing       Coordinated Entry access point  3  [facility]  Office - [facility] Distance: 7.31 miles      Phone/Virtual   [address] [address]  Language: English  Hours: Mon - Fri 8:30 AM - 12:00 PM , Mon - Fri 1:00 PM - 4:00 PM  Fees: Free   Phone: (719) 620-1156 Ext.2 Email: fredi@[facility].[employer].org Website: https://www.MarkerlyNemours FoundationCornerstone Propertiesusa.org/usn/     4  [facility]ult [facility]ter Connect (ASC) Distance: 8.01 miles      In-Person, Phone/Virtual   [address] [place] [address]  Language: English, Bruneian  Hours: Mon - Fri 10:00 AM - 5:30 PM , Mon - Sun 7:30 PM - 10:20 PM , Sat - Sun 1:00 PM - 5:30 PM  Fees: Free   Phone: (656) 331-6012 Email: info@[employer].SpiritShop.com Website: https://www.[employer].org/our-programs/adult-shelter-connect-lang-shelter/     Drop-in center or day shelter  5  [facility] Distance: 7.85 miles      In-Person   [address] [place] [address]  Language: English, Hmong, Sri Lankan, Bruneian  Hours: Mon - u 8:30 AM - 4:30 PM , Sat - Sun 9:00 AM - 12:00 PM  Fees: Free    Phone: (435) 645-2171 Email: info@[employer].flaregames Website: https://MovableInk.org/     6  [facility] [place] [facility] - Youth Drop-in Center Distance: 8.27 miles      In-Person   [address] [place] [address]  Language: English, Chadian  Hours: Mon - Sun Open 24 Hours  Fees: Free   Phone: (207) 625-7087 Email: youthlink@youthlinkmn.org Website: http://www.RunnerPlace.org     Housing search assistance  7  [facility] - Online housing search assistance Distance: 7.89 miles      Phone/Virtual   [address] [place] [address]  Language: English, Hmong, Nigerien, Chadian  Hours: Mon - Sun Open 24 Hours   Phone: (768) 123-6667 Email: info@Hyperpotlink.org Website: http://www.Hyperpotlink.org/     8  Affordable Housing Online - https://Metrekare/ Distance: 8.67 miles      Phone/Virtual   [address] [place] [address]  Language: English  Hours: Mon - Sun Open 24 Hours   Email: info@Z[employer]i Website: https://Metrekare     Shelter for families  9  [facility] Distance: 19.11 miles      In-Person   [address] [place] [address]  Language: English  Hours: Mon - Fri 3:00 PM - 9:00 AM , Sat - Sun Open 24 Hours  Fees: Free   Phone: (547) 964-4176 Ext.1 Website: https://www.saintandrews.org/2020/07/03/emergency-family-shelter/     Shelter for individuals  10  [facility] Distance: 8.23 miles      In-Person   [address] [place] [address]  Language: English  Hours: Mon - Fri 4:00 PM - 9:00 AM  Fees: Free   Phone: (447) 452-1115 Email: manolo@[facility].org Website: https://centralusa.[facility].org/northern/[facility]/     11  [facility] Distance: 9.73 miles      In-Person   [address] [place] [address]  Language: English  Hours: Mon - Sun Open 24 Hours  Fees: Free   Phone: (761) 244-3223 Email:  communications@oscs-[place].org Website: https://oscs-mn.org/oursaviourshousing/     Shelter for youth  12  [facility] - Emergency Youth Shelter Distance: 12.69 miles      In-Person   [address] [place] [address]  Language: English  Hours: Mon - Sun Open 24 Hours  Fees: Free   Phone: (904) 567-6424 Email: info@Manflu Website: https://www.Manflu/locations/hope-[place]/          Important Numbers & Websites       [address]  Poison Control   (363) 122-5384 [place]poison.org  Suicide and Crisis Lifeline   [address] [place], [place] [address]line.org  Childhelp [place] Child Abuse Hotline   705.952.8876 Childhelphotline.org  [place] Sexual Assault Hotline   (235) 687-5223 (HOPE) [facility]  National Runaway Safeline   (283) 656-1738 (RUNAWAY) [facility].org  Pregnancy & Postpartum Support [place]   Call/text 138-602-4495 Ppsupportmn.org  Substance Abuse National Helpline ([facility]   018-069-HELP (6429) Findtreatment.gov  Emergency Services   911

## 2023-11-07 LAB
ANION GAP SERPL CALCULATED.3IONS-SCNC: 12 MMOL/L (ref 7–15)
BUN SERPL-MCNC: 8.7 MG/DL (ref 6–20)
C TRACH DNA SPEC QL NAA+PROBE: NEGATIVE
CALCIUM SERPL-MCNC: 9.1 MG/DL (ref 8.6–10)
CHLORIDE SERPL-SCNC: 107 MMOL/L (ref 98–107)
CREAT SERPL-MCNC: 0.71 MG/DL (ref 0.51–0.95)
DEPRECATED HCO3 PLAS-SCNC: 21 MMOL/L (ref 22–29)
EGFRCR SERPLBLD CKD-EPI 2021: >90 ML/MIN/1.73M2
GLUCOSE SERPL-MCNC: 85 MG/DL (ref 70–99)
HCG INTACT+B SERPL-ACNC: <1 MIU/ML
N GONORRHOEA DNA SPEC QL NAA+PROBE: NEGATIVE
POTASSIUM SERPL-SCNC: 4.4 MMOL/L (ref 3.4–5.3)
SODIUM SERPL-SCNC: 140 MMOL/L (ref 135–145)
T4 FREE SERPL-MCNC: 0.99 NG/DL (ref 0.9–1.7)
TSH SERPL DL<=0.005 MIU/L-ACNC: 3.19 UIU/ML (ref 0.3–4.2)
VIT D+METAB SERPL-MCNC: 14 NG/ML (ref 20–50)

## 2023-11-09 LAB
BKR LAB AP GYN ADEQUACY: NORMAL
BKR LAB AP GYN INTERPRETATION: NORMAL
BKR LAB AP HPV REFLEX: NO
BKR LAB AP LMP: NORMAL
BKR LAB AP PREVIOUS ABNORMAL: NORMAL
PATH REPORT.COMMENTS IMP SPEC: NORMAL
PATH REPORT.COMMENTS IMP SPEC: NORMAL
PATH REPORT.RELEVANT HX SPEC: NORMAL

## 2023-11-22 ENCOUNTER — OFFICE VISIT (OUTPATIENT)
Dept: URGENT CARE | Facility: URGENT CARE | Age: 23
End: 2023-11-22
Payer: COMMERCIAL

## 2023-11-22 VITALS
SYSTOLIC BLOOD PRESSURE: 119 MMHG | WEIGHT: 203.7 LBS | OXYGEN SATURATION: 100 % | HEART RATE: 66 BPM | BODY MASS INDEX: 32.66 KG/M2 | TEMPERATURE: 97.7 F | RESPIRATION RATE: 16 BRPM | DIASTOLIC BLOOD PRESSURE: 78 MMHG

## 2023-11-22 DIAGNOSIS — R30.0 DYSURIA: ICD-10-CM

## 2023-11-22 DIAGNOSIS — N30.00 ACUTE CYSTITIS WITHOUT HEMATURIA: Primary | ICD-10-CM

## 2023-11-22 LAB
ALBUMIN UR-MCNC: NEGATIVE MG/DL
APPEARANCE UR: ABNORMAL
BACTERIA #/AREA URNS HPF: ABNORMAL /HPF
BILIRUB UR QL STRIP: NEGATIVE
CLUE CELLS: PRESENT
COLOR UR AUTO: YELLOW
GLUCOSE UR STRIP-MCNC: NEGATIVE MG/DL
HGB UR QL STRIP: ABNORMAL
KETONES UR STRIP-MCNC: NEGATIVE MG/DL
LEUKOCYTE ESTERASE UR QL STRIP: ABNORMAL
NITRATE UR QL: POSITIVE
PH UR STRIP: 6 [PH] (ref 5–7)
RBC #/AREA URNS AUTO: ABNORMAL /HPF
SP GR UR STRIP: 1.01 (ref 1–1.03)
SQUAMOUS #/AREA URNS AUTO: ABNORMAL /LPF
TRICHOMONAS, WET PREP: ABNORMAL
UROBILINOGEN UR STRIP-ACNC: 0.2 E.U./DL
WBC #/AREA URNS AUTO: ABNORMAL /HPF
WBC CLUMPS #/AREA URNS HPF: PRESENT /HPF
WBC'S/HIGH POWER FIELD, WET PREP: ABNORMAL
YEAST, WET PREP: ABNORMAL

## 2023-11-22 PROCEDURE — 81001 URINALYSIS AUTO W/SCOPE: CPT | Performed by: PHYSICIAN ASSISTANT

## 2023-11-22 PROCEDURE — 99213 OFFICE O/P EST LOW 20 MIN: CPT | Performed by: PHYSICIAN ASSISTANT

## 2023-11-22 PROCEDURE — 87210 SMEAR WET MOUNT SALINE/INK: CPT | Performed by: PHYSICIAN ASSISTANT

## 2023-11-22 PROCEDURE — 87086 URINE CULTURE/COLONY COUNT: CPT | Performed by: PHYSICIAN ASSISTANT

## 2023-11-22 RX ORDER — NITROFURANTOIN 25; 75 MG/1; MG/1
100 CAPSULE ORAL 2 TIMES DAILY
Qty: 14 CAPSULE | Refills: 0 | Status: SHIPPED | OUTPATIENT
Start: 2023-11-22 | End: 2023-11-29

## 2023-11-22 ASSESSMENT — ENCOUNTER SYMPTOMS
ACTIVITY CHANGE: 0
NECK STIFFNESS: 0
POLYDIPSIA: 0
RHINORRHEA: 0
NAUSEA: 0
MYALGIAS: 0
PALPITATIONS: 0
HEMATURIA: 0
CHILLS: 0
GASTROINTESTINAL NEGATIVE: 1
RESPIRATORY NEGATIVE: 1
COUGH: 0
DIARRHEA: 0
WEAKNESS: 0
FATIGUE: 0
VOMITING: 0
MUSCULOSKELETAL NEGATIVE: 1
FEVER: 0
NEUROLOGICAL NEGATIVE: 1
SORE THROAT: 0
ABDOMINAL PAIN: 0
CARDIOVASCULAR NEGATIVE: 1
DYSURIA: 1
CONSTITUTIONAL NEGATIVE: 1
HEADACHES: 0
LIGHT-HEADEDNESS: 0
SHORTNESS OF BREATH: 0
DIZZINESS: 0
ADENOPATHY: 0
FLANK PAIN: 0
FREQUENCY: 1
NECK PAIN: 0
ENDOCRINE NEGATIVE: 1

## 2023-11-22 NOTE — PROGRESS NOTES
Chief Complaint:    Chief Complaint   Patient presents with    UTI     Urinary frequency and burning. Onset- Sunday     ASSESSMENT  1. Acute cystitis without hematuria    2. Dysuria         PLAN    Urinalysis discussed with patient  We will call with culture results if resistant.  Wet prep was positive for clue cells.  No discharge or odor at this time.  Will hold off on BV treatment.  Rx for Macrobid today  Follow up with PCP in 1 week if symptoms are not improving.  Worrisome symptoms discussed with instructions to go to the ED.  Patient verbalized understanding and agreed with this plan.    Labs:     Results for orders placed or performed in visit on 11/22/23   UA Macroscopic with reflex to Microscopic and Culture - Lab Collect     Status: Abnormal    Specimen: Urine, Midstream   Result Value Ref Range    Color Urine Yellow Colorless, Straw, Light Yellow, Yellow    Appearance Urine Slightly Cloudy (A) Clear    Glucose Urine Negative Negative mg/dL    Bilirubin Urine Negative Negative    Ketones Urine Negative Negative mg/dL    Specific Gravity Urine 1.010 1.003 - 1.035    Blood Urine Small (A) Negative    pH Urine 6.0 5.0 - 7.0    Protein Albumin Urine Negative Negative mg/dL    Urobilinogen Urine 0.2 0.2, 1.0 E.U./dL    Nitrite Urine Positive (A) Negative    Leukocyte Esterase Urine Large (A) Negative   Urine Microscopic Exam     Status: Abnormal   Result Value Ref Range    Bacteria Urine Moderate (A) None Seen /HPF    RBC Urine 0-2 0-2 /HPF /HPF    WBC Urine 25-50 (A) 0-5 /HPF /HPF    Squamous Epithelials Urine Few (A) None Seen /LPF    WBC Clumps Urine Present (A) None Seen /HPF   Wet prep - lab collect     Status: Abnormal    Specimen: Vagina; Swab   Result Value Ref Range    Trichomonas Absent Absent    Yeast Absent Absent    Clue Cells Present (A) Absent    WBCs/high power field 2+ (A) None       Problem history    Patient Active Problem List   Diagnosis    Acanthosis nigricans    Chronic tonsillitis     Class 1 obesity due to excess calories with body mass index (BMI) of 31.0 to 31.9 in adult, unspecified whether serious comorbidity present    Family history of diabetes mellitus       Current Meds    Current Outpatient Medications:     nitroFURantoin macrocrystal-monohydrate (MACROBID) 100 MG capsule, Take 1 capsule (100 mg) by mouth 2 times daily for 7 days, Disp: 14 capsule, Rfl: 0    Allergies  Allergies   Allergen Reactions    Cats      Eyes water,     Seasonal Allergies        SUBJECTIVE    HPI:  Miracle Coburn is a 22 year old female who has symptoms of urinary dysuria, urgency, and frequency for 3 day(s).  she denies back pain, nausea, vomiting, fever, and chills, flank pain, vaginal discharge, and vaginal odor.    ROS:      Review of Systems   Constitutional: Negative.  Negative for activity change, chills, fatigue and fever.   HENT:  Negative for congestion, ear pain, rhinorrhea and sore throat.    Respiratory: Negative.  Negative for cough and shortness of breath.    Cardiovascular: Negative.  Negative for chest pain and palpitations.   Gastrointestinal: Negative.  Negative for abdominal pain, diarrhea, nausea and vomiting.   Endocrine: Negative.  Negative for polydipsia and polyuria.   Genitourinary:  Positive for dysuria, frequency and urgency. Negative for flank pain, hematuria, pelvic pain, vaginal discharge and vaginal pain.   Musculoskeletal: Negative.  Negative for myalgias, neck pain and neck stiffness.   Allergic/Immunologic: Negative for immunocompromised state.   Neurological: Negative.  Negative for dizziness, weakness, light-headedness and headaches.   Hematological:  Negative for adenopathy.       Family History   Family History   Problem Relation Age of Onset    Diabetes Mother     Hypertension Mother     Depression Mother     Anxiety Disorder Mother     Diabetes Paternal Grandmother         Social History  Social History     Socioeconomic History    Marital status: Single      Spouse name: Not on file    Number of children: Not on file    Years of education: Not on file    Highest education level: Not on file   Occupational History    Not on file   Tobacco Use    Smoking status: Never    Smokeless tobacco: Never   Vaping Use    Vaping Use: Not on file   Substance and Sexual Activity    Alcohol use: No    Drug use: No    Sexual activity: Yes     Partners: Male     Birth control/protection: Injection   Other Topics Concern    Not on file   Social History Narrative    Not on file     Social Determinants of Health     Financial Resource Strain: Unknown (11/6/2023)    Financial Resource Strain     Within the past 12 months, have you or your family members you live with been unable to get utilities (heat, electricity) when it was really needed?: Patient refused   Food Insecurity: Low Risk  (11/6/2023)    Food Insecurity     Within the past 12 months, did you worry that your food would run out before you got money to buy more?: No     Within the past 12 months, did the food you bought just not last and you didn t have money to get more?: No   Transportation Needs: Low Risk  (11/6/2023)    Transportation Needs     Within the past 12 months, has lack of transportation kept you from medical appointments, getting your medicines, non-medical meetings or appointments, work, or from getting things that you need?: No   Physical Activity: Not on file   Stress: Not on file   Social Connections: Not on file   Interpersonal Safety: Low Risk  (11/6/2023)    Interpersonal Safety     Do you feel physically and emotionally safe where you currently live?: Yes     Within the past 12 months, have you been hit, slapped, kicked or otherwise physically hurt by someone?: No     Within the past 12 months, have you been humiliated or emotionally abused in other ways by your partner or ex-partner?: No   Housing Stability: High Risk (11/6/2023)    Housing Stability     Do you have housing? : No     Are you worried about  losing your housing?: No           OBJECTIVE     Vital signs noted and reviewed by Yung Walker PA-C  /78 (BP Location: Left arm, Patient Position: Sitting, Cuff Size: Adult Regular)   Pulse 66   Temp 97.7  F (36.5  C) (Tympanic)   Resp 16   Wt 92.4 kg (203 lb 11.2 oz)   LMP 11/06/2023 (Exact Date)   SpO2 100%   BMI 32.66 kg/m       Physical Exam  Vitals and nursing note reviewed.   Constitutional:       General: She is not in acute distress.     Appearance: Normal appearance. She is well-developed. She is not ill-appearing, toxic-appearing or diaphoretic.   HENT:      Head: Normocephalic and atraumatic.      Right Ear: Tympanic membrane and external ear normal.      Left Ear: Tympanic membrane and external ear normal.   Eyes:      Pupils: Pupils are equal, round, and reactive to light.   Cardiovascular:      Rate and Rhythm: Normal rate and regular rhythm.      Heart sounds: Normal heart sounds. No murmur heard.     No friction rub. No gallop.   Pulmonary:      Effort: Pulmonary effort is normal. No respiratory distress.      Breath sounds: Normal breath sounds. No wheezing or rales.   Chest:      Chest wall: No tenderness.   Abdominal:      General: Bowel sounds are normal. There is no distension.      Palpations: Abdomen is soft. Abdomen is not rigid. There is no mass.      Tenderness: There is no abdominal tenderness. There is no guarding or rebound. Negative signs include Stapleton's sign and McBurney's sign.   Musculoskeletal:      Cervical back: Normal range of motion and neck supple.   Lymphadenopathy:      Cervical: No cervical adenopathy.   Skin:     General: Skin is warm and dry.   Neurological:      Mental Status: She is alert and oriented to person, place, and time.      Cranial Nerves: No cranial nerve deficit.      Deep Tendon Reflexes: Reflexes are normal and symmetric.   Psychiatric:         Behavior: Behavior normal. Behavior is cooperative.         Thought Content: Thought content  normal.         Judgment: Judgment normal.             Yung Walker PA-C  11/22/2023, 5:24 PM

## 2023-11-24 LAB — BACTERIA UR CULT: NORMAL

## 2024-03-23 ENCOUNTER — OFFICE VISIT (OUTPATIENT)
Dept: URGENT CARE | Facility: URGENT CARE | Age: 24
End: 2024-03-23
Payer: COMMERCIAL

## 2024-03-23 VITALS
HEART RATE: 77 BPM | RESPIRATION RATE: 20 BRPM | WEIGHT: 207.38 LBS | SYSTOLIC BLOOD PRESSURE: 120 MMHG | OXYGEN SATURATION: 100 % | DIASTOLIC BLOOD PRESSURE: 79 MMHG | BODY MASS INDEX: 33.25 KG/M2 | TEMPERATURE: 98.4 F

## 2024-03-23 DIAGNOSIS — K59.00 CONSTIPATION, UNSPECIFIED CONSTIPATION TYPE: Primary | ICD-10-CM

## 2024-03-23 PROCEDURE — 99213 OFFICE O/P EST LOW 20 MIN: CPT | Performed by: FAMILY MEDICINE

## 2024-03-23 RX ORDER — POLYETHYLENE GLYCOL 3350 17 G/17G
1 POWDER, FOR SOLUTION ORAL DAILY
COMMUNITY
Start: 2024-03-23

## 2024-03-23 NOTE — NURSING NOTE
She is taking magnesium citrate and then will go a little, but feels the stool is still stuck.   Naomi Gaming MA

## 2024-03-28 ENCOUNTER — TELEPHONE (OUTPATIENT)
Dept: FAMILY MEDICINE | Facility: CLINIC | Age: 24
End: 2024-03-28
Payer: COMMERCIAL

## 2024-03-28 NOTE — TELEPHONE ENCOUNTER
Pt calling because she believes she has pinworms.   States that she went in to UC on 3/23 due to constipation. States that she is doing better with the miralax.  Has been having 2 Bms a day.   States that she has mild abdominal pain that comes and goes. Pt is passing gas.     Stool is brown with no blood.  States that she is not sure if she saw something move in the stool. No known exposure to pinworms.  No fever, vomiting or rash.   Pt has an appointment for next week but wants to be seen sooner.    Assisted pt in scheduling an appointment.    Lily Mora, PERIN, RN  [facility]

## 2024-03-29 ENCOUNTER — OFFICE VISIT (OUTPATIENT)
Dept: FAMILY MEDICINE | Facility: CLINIC | Age: 24
End: 2024-03-29
Payer: COMMERCIAL

## 2024-03-29 VITALS
OXYGEN SATURATION: 100 % | BODY MASS INDEX: 33.75 KG/M2 | RESPIRATION RATE: 16 BRPM | HEART RATE: 87 BPM | HEIGHT: 66 IN | SYSTOLIC BLOOD PRESSURE: 126 MMHG | DIASTOLIC BLOOD PRESSURE: 84 MMHG | WEIGHT: 210 LBS

## 2024-03-29 DIAGNOSIS — R19.5 CHANGE IN STOOL: Primary | ICD-10-CM

## 2024-03-29 DIAGNOSIS — B83.9 WORM: ICD-10-CM

## 2024-03-29 PROCEDURE — 99213 OFFICE O/P EST LOW 20 MIN: CPT | Performed by: PHYSICIAN ASSISTANT

## 2024-03-29 PROCEDURE — 87209 SMEAR COMPLEX STAIN: CPT | Performed by: PHYSICIAN ASSISTANT

## 2024-03-29 PROCEDURE — 87177 OVA AND PARASITES SMEARS: CPT | Performed by: PHYSICIAN ASSISTANT

## 2024-03-29 ASSESSMENT — PAIN SCALES - GENERAL: PAINLEVEL: MODERATE PAIN (5)

## 2024-03-29 NOTE — PROGRESS NOTES
Assessment & Plan     Change in stool  Constipation has been improving.  Question true worm in stool but is possible.  Anal exam unremarkable, and really not having significant pruritus to suggest pinworms.  Will send ova and parasite and treat if positive.  Continue MiraLAX as needed.  - Ova and Parasite Exam Routine; Future  - Ova and Parasite Exam Routine    Worm  See above  - Ova and Parasite Exam Routine; Future  - Ova and Parasite Exam Routine        Karina Rausch is a 23 year old, presenting for the following health issues:  Hospital F/U and Pinworms         3/29/2024     3:50 PM   Additional Questions   Roomed by Elin   Accompanied by self     History of Present Illness       Reason for visit:  Stomach issue/ posible worm in stool  Symptom onset:  1-2 weeks ago  Symptoms include:  Cobstipation and irregular bowel movement. butt itch.stomach pain  Symptom intensity:  Mild  Symptom progression:  Improving  Had these symptoms before:  No    She eats 2-3 servings of fruits and vegetables daily.She consumes 2 sweetened beverage(s) daily.She exercises with enough effort to increase her heart rate 30 to 60 minutes per day.  She exercises with enough effort to increase her heart rate 3 or less days per week. She is missing 6 dose(s) of medications per week.         Concern - Possible pinworms   Onset: 1-2 weeks ago  Description: constipation for about 2 weeks, sharp pain in stomach   Intensity: moderate  Progression of Symptoms:  n/a  Accompanying Signs & Symptoms: n/a  Previous history of similar problem: n/a  Precipitating factors:        Worsened by: n/a  Alleviating factors:        Improved by: n/a  Therapies tried and outcome: None    Seen in urgent care 3/23/2024 for constipation.  Has been using MiraLAX with improvement.  She had a bowel movement and looked at her stool and thinks she may have seen something move.  It was small, white and sticking out of the stool.  She was unsure if it was the  "water in the toilet bowl moving or if it could have been a worm.  No known exposures.  No recent international travel.  No recent camping/contaminated water exposure.  Has not seen anything since.  Unsure about anal itching.  Thinks she may just be focusing on symptoms since she saw it and was to be safe.  Some intermittent left upper abdominal pain still.        Objective    /84 (BP Location: Left arm, Patient Position: Sitting, Cuff Size: Adult Regular)   Pulse 87   Resp 16   Ht 1.682 m (5' 6.22\")   Wt 95.3 kg (210 lb)   LMP 03/01/2024   SpO2 100%   BMI 33.67 kg/m    Body mass index is 33.67 kg/m .  Physical Exam   GENERAL: alert and no distress  ABDOMEN: soft, nontender, no hepatosplenomegaly, no masses and bowel sounds normal  RECTAL (female): normal sphincter tone, no irritation, signs of parasites            Signed Electronically by: Eryn Mcclain PA-C    "

## 2024-03-29 NOTE — PATIENT INSTRUCTIONS
Bring back the stool sample when you are able and you will get your results back on [facility]. If anything is found, I will send medication for you. Hopefully it all comes back negative!

## 2024-04-01 LAB — O+P STL MICRO: NEGATIVE

## 2024-07-26 ENCOUNTER — OFFICE VISIT (OUTPATIENT)
Dept: URGENT CARE | Facility: URGENT CARE | Age: 24
End: 2024-07-26
Payer: COMMERCIAL

## 2024-07-26 VITALS
HEART RATE: 83 BPM | RESPIRATION RATE: 20 BRPM | WEIGHT: 215.5 LBS | DIASTOLIC BLOOD PRESSURE: 74 MMHG | OXYGEN SATURATION: 99 % | BODY MASS INDEX: 34.55 KG/M2 | TEMPERATURE: 98 F | SYSTOLIC BLOOD PRESSURE: 117 MMHG

## 2024-07-26 DIAGNOSIS — N39.0 ACUTE UTI: Primary | ICD-10-CM

## 2024-07-26 LAB
ALBUMIN UR-MCNC: NEGATIVE MG/DL
APPEARANCE UR: ABNORMAL
BACTERIA #/AREA URNS HPF: ABNORMAL /HPF
BILIRUB UR QL STRIP: NEGATIVE
COLOR UR AUTO: YELLOW
GLUCOSE UR STRIP-MCNC: NEGATIVE MG/DL
HGB UR QL STRIP: ABNORMAL
KETONES UR STRIP-MCNC: NEGATIVE MG/DL
LEUKOCYTE ESTERASE UR QL STRIP: ABNORMAL
NITRATE UR QL: NEGATIVE
PH UR STRIP: 6 [PH] (ref 5–7)
RBC #/AREA URNS AUTO: ABNORMAL /HPF
SP GR UR STRIP: 1.02 (ref 1–1.03)
SQUAMOUS #/AREA URNS AUTO: ABNORMAL /LPF
UROBILINOGEN UR STRIP-ACNC: 0.2 E.U./DL
WBC #/AREA URNS AUTO: ABNORMAL /HPF

## 2024-07-26 PROCEDURE — 99213 OFFICE O/P EST LOW 20 MIN: CPT

## 2024-07-26 PROCEDURE — 81001 URINALYSIS AUTO W/SCOPE: CPT

## 2024-07-26 PROCEDURE — 87086 URINE CULTURE/COLONY COUNT: CPT

## 2024-07-26 RX ORDER — NITROFURANTOIN 25; 75 MG/1; MG/1
100 CAPSULE ORAL 2 TIMES DAILY
Qty: 10 CAPSULE | Refills: 0 | Status: SHIPPED | OUTPATIENT
Start: 2024-07-26 | End: 2024-07-31

## 2024-07-26 NOTE — PATIENT INSTRUCTIONS
Diagnosis: UTI (urinary tract infection) - suspected     Plan:   Lab results today were suspicious for urinary tract infection.   Confirmation will come from a urine culture in 1-2 days, assessing bacterial growth   The urine culture will be completed and you  will only receive a phone call if antibiotic treatment needs to be adjusted  Start antibiotics today, take full course   Monitor GI distress - common with antibiotic use (food in stomach can be helpful)   Monitor or diarrhea   Consider a probiotic, kefir, or yogurt with live active cultures to replace good bacteria in the gastrointestinal tract.  Can try Aso or phridum to help reduce pain   Pyridium will turn your urine orange and may stain your clothing.  Other   Avoid items that can irritate the bladder: caffeine, alcohol, spicy food, nicotine, carbonated drinks, and artificial sweeteners  Increasing fluids   Empty bladder frequently even if small amounts, helps to remove bacteria        Monitor for:   Symptoms are not getting better, or getting worse    New Fevers  Pain in the belly (abdomen) area below the bellybutton,  Low back back pain, on the sides, below the ribs- flanks   Nausea or vomiting  Unable to control bladder   Feeling confused, lightheaded or dizzy         UTI urinary tract infection, bladder infection   A bladder infection, also called cystitis, or urinary tract infection   Is where the inner lining of the bladder becomes inflamed (red and swollen) and the urine is full of bacteria.   It happens when bacteria travel up the urethra and into the bladder, usually from stool contact   Women are more likely to have bladder infections than men because their urethra is shorter.   The short urethra makes it easier for bacteria from the anus or genital area to reach the bladder.   This can happen during such activities as wiping or sexual intercourse. Most infections of the urinary tract are caused this way.   Bladder infections often occur in young  women who have just become sexually active and have sexual intercourse often.   Symptoms: a frequent and urgent need to urinate, a burning, stinging, or pressure sensation during urination   a crampy pain or discomfort in the lower abdomen just above the pubic bone or sometimes in the lower back   a need to urinate more often in the night, cloudy urine that smells bad, blood in the urine, leaking of urine, fever and occasionally chills.   Prevent  To help prevent a bladder infection from recurring:  -urinate often during the day, and empty your bladder completely each time.   In addition women should follow these guidelines:   - Keep the vaginal area clean.   - Wipe from front to back after a bowel movement.   - Be sure to wash the genital area each time you bathe or shower.   - However, use soap only on the outside of your vagina.   - The chemicals in soap may cause additional irritation.   - Urinate after intercourse. Never combine anal and vaginal intercourse.   - Wear cotton underwear, which allows better air circulation than nylon.    - Avoid tight clothes in the genital area, such as control-top pantyhose and tight jeans.   - Do not wear a wet bathing suit for long periods of time.

## 2024-07-26 NOTE — PROGRESS NOTES
URGENT CARE  Assessment & Plan   Assessment:   Miracle Coburn is a 23 year old female who's clinical presentation today is consistent with:   1. Acute UTI  - UA Macroscopic with reflex to Microscopic and Culture - Lab Collect; Future  - nitroFURantoin macrocrystal-monohydrate (MACROBID) 100 MG capsule;  Plan:  Will treat patient's suspected cystitis (recurrent) with antibiotics at this time, culture pending will call as needed, no concerns for progression to pyelonephritis at this time   additionally encouraged patient to increase fluid intake, practice good hygiene (wiping front to back, voiding after sexual intercourse), and avoidance of deodorant sprays.   Given recurrence recommend patient follow up with pcp or uro/gyn for further evaluation and possible treatment   Additionally we discussed if symptoms do not improve after starting today's treatment to follow up in 5-7 days, sooner if symptoms worsen, return precautions given    No alarm signs or symptoms present   Differential Diagnoses for this patient's chief complaint that I considered include:  Bacterial vaginosis, candidiasis, Vulvovaginitis, atrophic vaginitis, Overactive bladder, urethritis, interstitial cystitis, urethral irritation,  Pyelonephritis, nephrolithiasis, Pelvic organ (uterine/bladder) prolapse, Foreign body in bladder,  Atypical etiology of cystitis: fungal, viral, contact vs allergic vaginitis, STD: gonorrhea, chlamydia, trichomoniasis; PID, pregnancy,} Malignancy (vaginal, ovarian, cervical)     Patient is} agreeable to treatment plan and state they will follow-up if symptoms do not improve and/or if symptoms worsen   see patient's AVS 'monitor for' section for specific patient instructions given and discussed regarding what to watch for and when to follow up    FLORENTINO Jeffery [facility] [place]      ______________________________________________________________________      Subjective      HPI: Miracle Coburn  is a 23 year old  female who presents today for evaluation the following concerns:   The patient presents today complaining of dysuria and sensation of incomplete bladder emptying  for 2-3  days    Patient denies blood in urine   Patient endorses having a past history of frequent urinary tract infections}  Patient denies back pain/flank pain, fever, chills, or any abnormal vaginal discharge/odor or bleeding.     Review of Systems:  Pertinent review of systems as reflected in HPI, otherwise negative.     Objective    Physical Exam:  Vitals:    07/26/24 1055   BP: 117/74   BP Location: Left arm   Patient Position: Sitting   Cuff Size: Adult Large   Pulse: 83   Resp: 20   Temp: 98  F (36.7  C)   TempSrc: Tympanic   SpO2: 99%   Weight: 97.8 kg (215 lb 8 oz)     General: Alert and oriented, no acute distress, afebrile, normotensive  Psy/mental status: Nonanxious, cooperative  SKIN: Intact, no open areas  ABDOMEN:  soft, non-tender, non-distended    No flank pain or CVA tenderness   Pelvic/ :   Deferred    LABS:   Results for orders placed or performed in visit on 07/26/24   UA Macroscopic with reflex to Microscopic and Culture - Lab Collect     Status: Abnormal    Specimen: Urine, Clean Catch   Result Value Ref Range    Color Urine Yellow Colorless, Straw, Light Yellow, Yellow    Appearance Urine Cloudy (A) Clear    Glucose Urine Negative Negative mg/dL    Bilirubin Urine Negative Negative    Ketones Urine Negative Negative mg/dL    Specific Gravity Urine 1.025 1.003 - 1.035    Blood Urine Small (A) Negative    pH Urine 6.0 5.0 - 7.0    Protein Albumin Urine Negative Negative mg/dL    Urobilinogen Urine 0.2 0.2, 1.0 E.U./dL    Nitrite Urine Negative Negative    Leukocyte Esterase Urine Large (A) Negative   Urine Microscopic Exam     Status: Abnormal   Result Value Ref Range    Bacteria Urine Few (A) None Seen /HPF    RBC Urine 5-10 (A) 0-2 /HPF /HPF    WBC Urine  (A) 0-5  /HPF /HPF    Squamous Epithelials Urine Few (A) None Seen /LPF        ______________________________________________________________________    I explained my diagnostic considerations and recommendations to the patient, who voiced understanding and agreement with the treatment plan.   All questions were answered.   We discussed potential side effects, risks and benefits of any prescribed or recommended therapies, as well as expectations for response to treatments.  Please see AVS for any patient instructions & handouts given.   Patient was advised to contact the Nurse Care Line, their Primary Care provider, Urgent Care, or the Emergency Department if there are new or worsening symptoms, or call 911 for emergencies.

## 2024-07-28 LAB — BACTERIA UR CULT: NORMAL

## 2024-09-30 ENCOUNTER — OFFICE VISIT (OUTPATIENT)
Dept: URGENT CARE | Facility: URGENT CARE | Age: 24
End: 2024-09-30
Payer: COMMERCIAL

## 2024-09-30 VITALS
SYSTOLIC BLOOD PRESSURE: 111 MMHG | HEART RATE: 65 BPM | OXYGEN SATURATION: 99 % | RESPIRATION RATE: 20 BRPM | BODY MASS INDEX: 33.8 KG/M2 | WEIGHT: 210.8 LBS | DIASTOLIC BLOOD PRESSURE: 76 MMHG | TEMPERATURE: 97.9 F

## 2024-09-30 DIAGNOSIS — J02.9 PHARYNGITIS, UNSPECIFIED ETIOLOGY: Primary | ICD-10-CM

## 2024-09-30 LAB
DEPRECATED S PYO AG THROAT QL EIA: NEGATIVE
GROUP A STREP BY PCR: NOT DETECTED

## 2024-09-30 PROCEDURE — 87651 STREP A DNA AMP PROBE: CPT | Performed by: EMERGENCY MEDICINE

## 2024-09-30 PROCEDURE — 99213 OFFICE O/P EST LOW 20 MIN: CPT | Performed by: EMERGENCY MEDICINE

## 2024-09-30 NOTE — PROGRESS NOTES
Assessment & Plan     Diagnosis:    ICD-10-CM    1. Pharyngitis, unspecified etiology  J02.9 Streptococcus A Rapid Screen w/Reflex to PCR - Clinic Collect     Group A Streptococcus PCR Throat Swab        Medical Decision Making:  Miracle Coburn is a 23 year old female presented with sore throat and clinical evidence of pharyngitis.  The rapid strep test is negative, and formal culture has been set up in the lab. There is no clinical evidence of  peritonsillar abscess, retropharyngeal abscess, epiglottis, or Andrew's angina. COVID-19 PCR is pending at this time. Influenza testing is negative.  The patient's symptoms are consistent with viral pharyngitis.  I have recommended treatment with analgesics, and we will await formal culture results.  If the culture is positive, a provider will call the patient to initiate anti-microbial therapy. Go to the ER if increasing pain, change in voice, neck pain, vomiting, fever, or shortness of breath. Follow-up with primary physician if not improving in 3-5 days. All questions answered. Patient verbalized understanding and agreement with the plan.    Wilfrid Shore PA-C  [facility] URGENT CARE    Subjective     Miracle Coburn is a 23 year old female who presents to clinic today for the following health issues:  Chief Complaint   Patient presents with    Pharyngitis     Sore throat possible strep onset yesterday    Cough     Dry cough today        HPI  Patient reports sore throat starting yesterday, slight upset stomach, slight dry cough this morning.  No difficulties breathing or swallowing; wants to make sure she does not have strep throat.  She denies any difficulty swallowing or breathing, abdominal pain, nausea, vomiting, diarrhea, ear pain, fevers, body aches or other concerns.      Review of Systems    See HPI    Objective      Vitals: /76 (BP Location: Left arm, Patient Position: Sitting, Cuff Size: Adult Large)   Pulse 65   Temp  97.9  F (36.6  C) (Tympanic)   Resp 20   Wt 95.6 kg (210 lb 12.8 oz)   LMP 09/16/2024   SpO2 99%   BMI 33.80 kg/m      Patient Vitals for the past 24 hrs:   BP Temp Temp src Pulse Resp SpO2 Weight   09/30/24 1322 111/76 97.9  F (36.6  C) Tympanic 65 20 99 % 95.6 kg (210 lb 12.8 oz)       Vital signs reviewed by: Wilfrid Shore PA-C    Physical Exam   Constitutional: Alert and active. No acute distress.  Non-toxic appearing.  HENT:   Right Ear: External ear normal. TM: gray/pale appearing  Left Ear: External ear normal. TM: gray/pale appearing.  Nose: Nose normal.    Eyes: Conjunctivae, EOM and lids are normal.   Mouth: Mucous membranes are moist. Posterior oropharynx is erythematous. Exudates not present. Tonsils are symmetrically enlarged. Uvula is midline. No submandibular swelling or erythema.  Neck: Normal ROM. No meningismus.  Cardiovascular: Regular rate and rhythm  Pulmonary/Chest: Effort normal. No respiratory distress. Lungs are clear to auscultation throughout.  Skin: No rash noted on visualized skin.       Labs/Imaging:  Results for orders placed or performed in visit on 09/30/24   Streptococcus A Rapid Screen w/Reflex to PCR - Clinic Collect     Status: Normal    Specimen: Throat; Swab   Result Value Ref Range    Group A Strep antigen Negative Negative       Wilfrid Shore PA-C, September 30, 2024

## 2024-12-12 ENCOUNTER — TELEPHONE (OUTPATIENT)
Dept: FAMILY MEDICINE | Facility: CLINIC | Age: 24
End: 2024-12-12

## 2024-12-12 NOTE — TELEPHONE ENCOUNTER
Patient Quality Outreach    Patient is due for the following:   Physical Preventive Adult Physical      Topic Date Due    Flu Vaccine (1) 09/01/2024    COVID-19 Vaccine (4 - 2024-25 season) 09/01/2024     Chlamydia Screening    Action(s) Taken:   Schedule a Adult Preventative and Chlamydia Screening    Type of outreach:    Sent [employer] message.    Questions for provider review:    None           An Ortiz, [facility]  Chart routed to none.

## 2024-12-29 ENCOUNTER — HEALTH MAINTENANCE LETTER (OUTPATIENT)
Age: 24
End: 2024-12-29

## (undated) DEVICE — SYR 10ML FINGER CONTROL W/O NDL 309695

## (undated) DEVICE — SUCTION TIP YANKAUER W/O VENT K86

## (undated) DEVICE — ESU PENCIL W/HOLSTER

## (undated) DEVICE — ESU SUCTION CAUTERY 10FR FOOT CONTROL E2505-10FR

## (undated) DEVICE — ESU ELEC BLADE 2.75" COATED/INSULATED E1455

## (undated) DEVICE — ESU GROUND PAD ADULT W/CORD E7507

## (undated) DEVICE — BASIN SET MINOR DISP

## (undated) DEVICE — ANTIFOG SOLUTION W/FOAM PAD CF-1001

## (undated) DEVICE — DRAPE SHEET HALF 40X60" 9358

## (undated) DEVICE — MARKER SKIN DOUBLE TIP W/FLEXI-RULER W/LABELS

## (undated) DEVICE — GLOVE PROTEXIS W/NEU-THERA 7.5  2D73TE75

## (undated) DEVICE — SUCTION CANISTER MEDIVAC LINER 1500ML W/LID 65651-515

## (undated) DEVICE — TUBING SUCTION 10'X3/16" N510

## (undated) DEVICE — NDL 25GA 1.5" 305127

## (undated) DEVICE — PACK SET-UP STD 9102

## (undated) DEVICE — NDL 19GA 1.5"

## (undated) DEVICE — GOWN XLG DISP 9545

## (undated) DEVICE — SOL WATER IRRIG 1000ML BOTTLE 07139-09

## (undated) RX ORDER — ONDANSETRON 2 MG/ML
INJECTION INTRAMUSCULAR; INTRAVENOUS
Status: DISPENSED
Start: 2020-01-09

## (undated) RX ORDER — GABAPENTIN 300 MG/1
CAPSULE ORAL
Status: DISPENSED
Start: 2020-01-09

## (undated) RX ORDER — DEXAMETHASONE SODIUM PHOSPHATE 4 MG/ML
INJECTION, SOLUTION INTRA-ARTICULAR; INTRALESIONAL; INTRAMUSCULAR; INTRAVENOUS; SOFT TISSUE
Status: DISPENSED
Start: 2020-01-09

## (undated) RX ORDER — CEFAZOLIN SODIUM 2 G/100ML
INJECTION, SOLUTION INTRAVENOUS
Status: DISPENSED
Start: 2020-01-09

## (undated) RX ORDER — OXYCODONE HYDROCHLORIDE 5 MG/1
TABLET ORAL
Status: DISPENSED
Start: 2020-01-09

## (undated) RX ORDER — FENTANYL CITRATE 50 UG/ML
INJECTION, SOLUTION INTRAMUSCULAR; INTRAVENOUS
Status: DISPENSED
Start: 2020-01-09

## (undated) RX ORDER — KETOROLAC TROMETHAMINE 30 MG/ML
INJECTION, SOLUTION INTRAMUSCULAR; INTRAVENOUS
Status: DISPENSED
Start: 2020-01-09

## (undated) RX ORDER — PROPOFOL 10 MG/ML
INJECTION, EMULSION INTRAVENOUS
Status: DISPENSED
Start: 2020-01-09

## (undated) RX ORDER — OXYCODONE HCL 5 MG/5 ML
SOLUTION, ORAL ORAL
Status: DISPENSED
Start: 2020-01-09

## (undated) RX ORDER — ACETAMINOPHEN 325 MG/1
TABLET ORAL
Status: DISPENSED
Start: 2020-01-09